# Patient Record
Sex: MALE | Race: WHITE | HISPANIC OR LATINO
[De-identification: names, ages, dates, MRNs, and addresses within clinical notes are randomized per-mention and may not be internally consistent; named-entity substitution may affect disease eponyms.]

---

## 2021-02-11 ENCOUNTER — NON-APPOINTMENT (OUTPATIENT)
Age: 71
End: 2021-02-11

## 2021-02-11 DIAGNOSIS — Z78.9 OTHER SPECIFIED HEALTH STATUS: ICD-10-CM

## 2021-02-11 DIAGNOSIS — E78.2 MIXED HYPERLIPIDEMIA: ICD-10-CM

## 2021-02-11 DIAGNOSIS — Z86.2 PERSONAL HISTORY OF DISEASES OF THE BLOOD AND BLOOD-FORMING ORGANS AND CERTAIN DISORDERS INVOLVING THE IMMUNE MECHANISM: ICD-10-CM

## 2021-02-11 DIAGNOSIS — R73.03 PREDIABETES.: ICD-10-CM

## 2021-02-11 DIAGNOSIS — N40.0 BENIGN PROSTATIC HYPERPLASIA WITHOUT LOWER URINARY TRACT SYMPMS: ICD-10-CM

## 2021-02-11 PROBLEM — Z00.00 ENCOUNTER FOR PREVENTIVE HEALTH EXAMINATION: Status: ACTIVE | Noted: 2021-02-11

## 2021-02-11 RX ORDER — ATORVASTATIN CALCIUM 80 MG/1
TABLET, FILM COATED ORAL
Refills: 0 | Status: ACTIVE | COMMUNITY

## 2021-02-11 RX ORDER — LOSARTAN POTASSIUM 100 MG/1
TABLET, FILM COATED ORAL
Refills: 0 | Status: ACTIVE | COMMUNITY

## 2021-02-11 RX ORDER — METOPROLOL SUCCINATE 200 MG/1
TABLET, EXTENDED RELEASE ORAL
Refills: 0 | Status: ACTIVE | COMMUNITY

## 2021-02-21 ENCOUNTER — LABORATORY RESULT (OUTPATIENT)
Age: 71
End: 2021-02-21

## 2021-02-22 ENCOUNTER — APPOINTMENT (OUTPATIENT)
Dept: GASTROENTEROLOGY | Facility: CLINIC | Age: 71
End: 2021-02-22
Payer: MEDICARE

## 2021-02-22 VITALS
SYSTOLIC BLOOD PRESSURE: 110 MMHG | BODY MASS INDEX: 26.83 KG/M2 | DIASTOLIC BLOOD PRESSURE: 68 MMHG | HEIGHT: 68 IN | WEIGHT: 177 LBS

## 2021-02-22 DIAGNOSIS — R10.13 EPIGASTRIC PAIN: ICD-10-CM

## 2021-02-22 DIAGNOSIS — B96.81 GASTRITIS, UNSPECIFIED, W/OUT BLEEDING: ICD-10-CM

## 2021-02-22 DIAGNOSIS — K29.70 GASTRITIS, UNSPECIFIED, W/OUT BLEEDING: ICD-10-CM

## 2021-02-22 PROCEDURE — 83014 H PYLORI DRUG ADMIN: CPT

## 2021-02-22 PROCEDURE — 99072 ADDL SUPL MATRL&STAF TM PHE: CPT

## 2021-02-22 PROCEDURE — 99214 OFFICE O/P EST MOD 30 MIN: CPT

## 2021-02-22 RX ORDER — SODIUM SULFATE, POTASSIUM SULFATE, MAGNESIUM SULFATE 17.5; 3.13; 1.6 G/ML; G/ML; G/ML
17.5-3.13-1.6 SOLUTION, CONCENTRATE ORAL
Refills: 0 | Status: DISCONTINUED | COMMUNITY
End: 2021-02-22

## 2021-02-22 RX ORDER — OMEPRAZOLE 40 MG/1
40 CAPSULE, DELAYED RELEASE ORAL
Refills: 0 | Status: DISCONTINUED | COMMUNITY
End: 2021-02-22

## 2021-02-22 RX ORDER — IBUPROFEN 600 MG/1
600 TABLET ORAL
Qty: 20 | Refills: 0 | Status: ACTIVE | COMMUNITY
Start: 2021-01-29

## 2021-02-22 RX ORDER — AMOXICILLIN 500 MG/1
500 CAPSULE ORAL
Refills: 0 | Status: DISCONTINUED | COMMUNITY
End: 2021-02-22

## 2021-02-22 RX ORDER — CLARITHROMYCIN 500 MG/1
500 TABLET, FILM COATED ORAL
Refills: 0 | Status: DISCONTINUED | COMMUNITY
End: 2021-02-22

## 2021-02-22 RX ORDER — PANTOPRAZOLE 40 MG/1
40 TABLET, DELAYED RELEASE ORAL
Qty: 30 | Refills: 1 | Status: ACTIVE | COMMUNITY
Start: 2021-02-22 | End: 1900-01-01

## 2021-02-22 RX ORDER — OMEPRAZOLE 20 MG/1
20 CAPSULE, DELAYED RELEASE ORAL
Qty: 28 | Refills: 0 | Status: DISCONTINUED | COMMUNITY
Start: 2021-01-18

## 2021-02-22 RX ORDER — FAMOTIDINE 40 MG/1
40 TABLET, FILM COATED ORAL
Refills: 0 | Status: DISCONTINUED | COMMUNITY
End: 2021-02-22

## 2021-02-22 NOTE — HISTORY OF PRESENT ILLNESS
[FreeTextEntry1] : Is a 7-year-old male returns for his breath test to document eradication of H. pylori.  He still complains of occasional postprandial discomfort in the epigastrium.  There is no nausea or vomiting.

## 2021-02-22 NOTE — ASSESSMENT
[FreeTextEntry1] : 77-year-old male history of H. pylori gastritis status post treatment for breath test.  Residual epigastric discomfort likely secondary to gastritis.  We will treat with proton pump inhibitor for an additional 4 weeks and see him in follow-up in 1 month.

## 2021-02-22 NOTE — PHYSICAL EXAM
[General Appearance - Alert] : alert [General Appearance - In No Acute Distress] : in no acute distress [Sclera] : the sclera and conjunctiva were normal [PERRL With Normal Accommodation] : pupils were equal in size, round, and reactive to light [Extraocular Movements] : extraocular movements were intact [Outer Ear] : the ears and nose were normal in appearance [Oropharynx] : the oropharynx was normal [Neck Appearance] : the appearance of the neck was normal [Neck Cervical Mass (___cm)] : no neck mass was observed [Jugular Venous Distention Increased] : there was no jugular-venous distention [Thyroid Diffuse Enlargement] : the thyroid was not enlarged [Thyroid Nodule] : there were no palpable thyroid nodules [] : no respiratory distress [Auscultation Breath Sounds / Voice Sounds] : lungs were clear to auscultation bilaterally [Heart Rate And Rhythm] : heart rate was normal and rhythm regular [Heart Sounds] : normal S1 and S2 [Heart Sounds Gallop] : no gallops [Murmurs] : no murmurs [Heart Sounds Pericardial Friction Rub] : no pericardial rub [Bowel Sounds] : normal bowel sounds [Abdomen Soft] : soft [Abdomen Tenderness] : non-tender [No CVA Tenderness] : no ~M costovertebral angle tenderness [Abnormal Walk] : normal gait

## 2021-02-24 RX ORDER — AMOXICILLIN 500 MG/1
500 CAPSULE ORAL
Qty: 56 | Refills: 0 | Status: ACTIVE | COMMUNITY
Start: 2021-02-24 | End: 1900-01-01

## 2021-02-24 RX ORDER — LEVOFLOXACIN 500 MG/1
500 TABLET, FILM COATED ORAL DAILY
Qty: 14 | Refills: 0 | Status: ACTIVE | COMMUNITY
Start: 2021-02-24 | End: 1900-01-01

## 2021-02-24 RX ORDER — OMEPRAZOLE 20 MG/1
20 CAPSULE, DELAYED RELEASE ORAL
Qty: 28 | Refills: 0 | Status: ACTIVE | COMMUNITY
Start: 2021-02-24 | End: 1900-01-01

## 2021-03-08 ENCOUNTER — APPOINTMENT (OUTPATIENT)
Dept: GASTROENTEROLOGY | Facility: CLINIC | Age: 71
End: 2021-03-08

## 2021-03-26 ENCOUNTER — APPOINTMENT (OUTPATIENT)
Dept: GASTROENTEROLOGY | Facility: CLINIC | Age: 71
End: 2021-03-26

## 2021-03-26 ENCOUNTER — APPOINTMENT (OUTPATIENT)
Dept: GASTROENTEROLOGY | Facility: CLINIC | Age: 71
End: 2021-03-26
Payer: MEDICARE

## 2021-03-26 PROCEDURE — 83014 H PYLORI DRUG ADMIN: CPT

## 2021-03-26 PROCEDURE — 99072 ADDL SUPL MATRL&STAF TM PHE: CPT

## 2021-03-29 ENCOUNTER — NON-APPOINTMENT (OUTPATIENT)
Age: 71
End: 2021-03-29

## 2021-03-29 LAB — UREA BREATH TEST QL: NEGATIVE

## 2024-03-06 ENCOUNTER — APPOINTMENT (OUTPATIENT)
Dept: GASTROENTEROLOGY | Facility: CLINIC | Age: 74
End: 2024-03-06
Payer: MEDICARE

## 2024-03-06 VITALS
RESPIRATION RATE: 12 BRPM | DIASTOLIC BLOOD PRESSURE: 76 MMHG | SYSTOLIC BLOOD PRESSURE: 130 MMHG | TEMPERATURE: 97.8 F | HEART RATE: 86 BPM | BODY MASS INDEX: 27.89 KG/M2 | HEIGHT: 68 IN | WEIGHT: 184 LBS | OXYGEN SATURATION: 97 %

## 2024-03-06 PROCEDURE — 99213 OFFICE O/P EST LOW 20 MIN: CPT

## 2024-03-06 NOTE — HISTORY OF PRESENT ILLNESS
[FreeTextEntry1] : 73year-old male previous hpylori, eradicated with 2months of epigastric pain, no nausea, no vomiting. Has good appetite.no weight loss.occ baby asa

## 2024-03-06 NOTE — PHYSICAL EXAM
[General Appearance - Alert] : alert [General Appearance - In No Acute Distress] : in no acute distress [Sclera] : the sclera and conjunctiva were normal [Extraocular Movements] : extraocular movements were intact [PERRL With Normal Accommodation] : pupils were equal in size, round, and reactive to light [Outer Ear] : the ears and nose were normal in appearance [Oropharynx] : the oropharynx was normal [Neck Appearance] : the appearance of the neck was normal [Thyroid Diffuse Enlargement] : the thyroid was not enlarged [Jugular Venous Distention Increased] : there was no jugular-venous distention [Neck Cervical Mass (___cm)] : no neck mass was observed [] : no respiratory distress [Thyroid Nodule] : there were no palpable thyroid nodules [Auscultation Breath Sounds / Voice Sounds] : lungs were clear to auscultation bilaterally [Heart Rate And Rhythm] : heart rate was normal and rhythm regular [Heart Sounds] : normal S1 and S2 [Heart Sounds Gallop] : no gallops [Murmurs] : no murmurs [Heart Sounds Pericardial Friction Rub] : no pericardial rub [Bowel Sounds] : normal bowel sounds [Abdomen Soft] : soft [Abdomen Tenderness] : non-tender [No CVA Tenderness] : no ~M costovertebral angle tenderness [Abnormal Walk] : normal gait

## 2024-03-06 NOTE — ASSESSMENT
[FreeTextEntry1] : 73year-old male previous hpylori, eradicated with 2months of epigastric pain, no nausea, no vomiting. Has good appetite.no weight loss.  1. epigastric pain 2. r/o ulcer 3. Hx cad  PLAN: esomeprazole 40 mg po qd x 2 months 2 months rpa if no improvement then egd.

## 2024-10-06 ENCOUNTER — INPATIENT (INPATIENT)
Facility: HOSPITAL | Age: 74
LOS: 1 days | Discharge: HOME | DRG: 661 | End: 2024-10-08
Attending: UROLOGY | Admitting: UROLOGY
Payer: COMMERCIAL

## 2024-10-06 VITALS
SYSTOLIC BLOOD PRESSURE: 189 MMHG | OXYGEN SATURATION: 97 % | HEART RATE: 90 BPM | TEMPERATURE: 98 F | HEIGHT: 68 IN | WEIGHT: 175.05 LBS | RESPIRATION RATE: 19 BRPM | DIASTOLIC BLOOD PRESSURE: 88 MMHG

## 2024-10-06 LAB
ALBUMIN SERPL ELPH-MCNC: 4 G/DL — SIGNIFICANT CHANGE UP (ref 3.3–5)
ALP SERPL-CCNC: 88 U/L — SIGNIFICANT CHANGE UP (ref 40–120)
ALT FLD-CCNC: 15 U/L — SIGNIFICANT CHANGE UP (ref 10–45)
ANION GAP SERPL CALC-SCNC: 11 MMOL/L — SIGNIFICANT CHANGE UP (ref 5–17)
APPEARANCE UR: CLEAR — SIGNIFICANT CHANGE UP
AST SERPL-CCNC: 18 U/L — SIGNIFICANT CHANGE UP (ref 10–40)
BACTERIA # UR AUTO: NEGATIVE /HPF — SIGNIFICANT CHANGE UP
BASOPHILS # BLD AUTO: 0.04 K/UL — SIGNIFICANT CHANGE UP (ref 0–0.2)
BASOPHILS NFR BLD AUTO: 0.3 % — SIGNIFICANT CHANGE UP (ref 0–2)
BILIRUB SERPL-MCNC: 0.8 MG/DL — SIGNIFICANT CHANGE UP (ref 0.2–1.2)
BILIRUB UR-MCNC: NEGATIVE — SIGNIFICANT CHANGE UP
BUN SERPL-MCNC: 23 MG/DL — SIGNIFICANT CHANGE UP (ref 7–23)
CALCIUM SERPL-MCNC: 9.2 MG/DL — SIGNIFICANT CHANGE UP (ref 8.4–10.5)
CAST: 0 /LPF — SIGNIFICANT CHANGE UP (ref 0–4)
CHLORIDE SERPL-SCNC: 100 MMOL/L — SIGNIFICANT CHANGE UP (ref 96–108)
CO2 SERPL-SCNC: 24 MMOL/L — SIGNIFICANT CHANGE UP (ref 22–31)
COLOR SPEC: YELLOW — SIGNIFICANT CHANGE UP
CREAT SERPL-MCNC: 2.4 MG/DL — HIGH (ref 0.5–1.3)
DIFF PNL FLD: NEGATIVE — SIGNIFICANT CHANGE UP
EGFR: 28 ML/MIN/1.73M2 — LOW
EOSINOPHIL # BLD AUTO: 0.01 K/UL — SIGNIFICANT CHANGE UP (ref 0–0.5)
EOSINOPHIL NFR BLD AUTO: 0.1 % — SIGNIFICANT CHANGE UP (ref 0–6)
GAS PNL BLDV: SIGNIFICANT CHANGE UP
GLUCOSE SERPL-MCNC: 136 MG/DL — HIGH (ref 70–99)
GLUCOSE UR QL: NEGATIVE MG/DL — SIGNIFICANT CHANGE UP
HCT VFR BLD CALC: 43.8 % — SIGNIFICANT CHANGE UP (ref 39–50)
HGB BLD-MCNC: 14.6 G/DL — SIGNIFICANT CHANGE UP (ref 13–17)
IMM GRANULOCYTES NFR BLD AUTO: 0.5 % — SIGNIFICANT CHANGE UP (ref 0–0.9)
KETONES UR-MCNC: NEGATIVE MG/DL — SIGNIFICANT CHANGE UP
LEUKOCYTE ESTERASE UR-ACNC: NEGATIVE — SIGNIFICANT CHANGE UP
LIDOCAIN IGE QN: 19 U/L — SIGNIFICANT CHANGE UP (ref 7–60)
LYMPHOCYTES # BLD AUTO: 0.53 K/UL — LOW (ref 1–3.3)
LYMPHOCYTES # BLD AUTO: 4 % — LOW (ref 13–44)
MCHC RBC-ENTMCNC: 29.3 PG — SIGNIFICANT CHANGE UP (ref 27–34)
MCHC RBC-ENTMCNC: 33.3 GM/DL — SIGNIFICANT CHANGE UP (ref 32–36)
MCV RBC AUTO: 88 FL — SIGNIFICANT CHANGE UP (ref 80–100)
MONOCYTES # BLD AUTO: 1.24 K/UL — HIGH (ref 0–0.9)
MONOCYTES NFR BLD AUTO: 9.4 % — SIGNIFICANT CHANGE UP (ref 2–14)
NEUTROPHILS # BLD AUTO: 11.26 K/UL — HIGH (ref 1.8–7.4)
NEUTROPHILS NFR BLD AUTO: 85.7 % — HIGH (ref 43–77)
NITRITE UR-MCNC: NEGATIVE — SIGNIFICANT CHANGE UP
NRBC # BLD: 0 /100 WBCS — SIGNIFICANT CHANGE UP (ref 0–0)
PH UR: 6 — SIGNIFICANT CHANGE UP (ref 5–8)
PLATELET # BLD AUTO: 183 K/UL — SIGNIFICANT CHANGE UP (ref 150–400)
POTASSIUM SERPL-MCNC: 4.4 MMOL/L — SIGNIFICANT CHANGE UP (ref 3.5–5.3)
POTASSIUM SERPL-SCNC: 4.4 MMOL/L — SIGNIFICANT CHANGE UP (ref 3.5–5.3)
PROT SERPL-MCNC: 7.1 G/DL — SIGNIFICANT CHANGE UP (ref 6–8.3)
PROT UR-MCNC: NEGATIVE MG/DL — SIGNIFICANT CHANGE UP
RBC # BLD: 4.98 M/UL — SIGNIFICANT CHANGE UP (ref 4.2–5.8)
RBC # FLD: 12.9 % — SIGNIFICANT CHANGE UP (ref 10.3–14.5)
RBC CASTS # UR COMP ASSIST: 0 /HPF — SIGNIFICANT CHANGE UP (ref 0–4)
SODIUM SERPL-SCNC: 135 MMOL/L — SIGNIFICANT CHANGE UP (ref 135–145)
SP GR SPEC: 1.01 — SIGNIFICANT CHANGE UP (ref 1–1.03)
SQUAMOUS # UR AUTO: 0 /HPF — SIGNIFICANT CHANGE UP (ref 0–5)
UROBILINOGEN FLD QL: 0.2 MG/DL — SIGNIFICANT CHANGE UP (ref 0.2–1)
WBC # BLD: 13.14 K/UL — HIGH (ref 3.8–10.5)
WBC # FLD AUTO: 13.14 K/UL — HIGH (ref 3.8–10.5)
WBC UR QL: 0 /HPF — SIGNIFICANT CHANGE UP (ref 0–5)

## 2024-10-06 PROCEDURE — 99285 EMERGENCY DEPT VISIT HI MDM: CPT

## 2024-10-06 PROCEDURE — 74177 CT ABD & PELVIS W/CONTRAST: CPT | Mod: 26,MC

## 2024-10-06 RX ORDER — KETOROLAC TROMETHAMINE 10 MG/1
15 TABLET, FILM COATED ORAL ONCE
Refills: 0 | Status: DISCONTINUED | OUTPATIENT
Start: 2024-10-06 | End: 2024-10-06

## 2024-10-06 RX ORDER — ONDANSETRON HCL/PF 4 MG/2 ML
4 VIAL (ML) INJECTION ONCE
Refills: 0 | Status: COMPLETED | OUTPATIENT
Start: 2024-10-06 | End: 2024-10-06

## 2024-10-06 RX ORDER — SODIUM CHLORIDE 0.9 % (FLUSH) 0.9 %
1000 SYRINGE (ML) INJECTION ONCE
Refills: 0 | Status: COMPLETED | OUTPATIENT
Start: 2024-10-06 | End: 2024-10-06

## 2024-10-06 RX ORDER — MORPHINE SULFATE 30 MG/1
4 TABLET, FILM COATED, EXTENDED RELEASE ORAL ONCE
Refills: 0 | Status: DISCONTINUED | OUTPATIENT
Start: 2024-10-06 | End: 2024-10-06

## 2024-10-06 RX ADMIN — Medication 1000 MILLILITER(S): at 21:07

## 2024-10-06 RX ADMIN — Medication 4 MILLIGRAM(S): at 21:08

## 2024-10-06 RX ADMIN — MORPHINE SULFATE 4 MILLIGRAM(S): 30 TABLET, FILM COATED, EXTENDED RELEASE ORAL at 21:10

## 2024-10-06 RX ADMIN — KETOROLAC TROMETHAMINE 15 MILLIGRAM(S): 10 TABLET, FILM COATED ORAL at 21:11

## 2024-10-06 NOTE — ED ADULT NURSE NOTE - OBJECTIVE STATEMENT
74y M Denise x4 came in for right sided flank pain. Patient reports he was seen at [facility] two days ago and was told her has a kidney stone. Patient was sent home with d/c instructions to monitor for the stone to pass but states pain has been continuously worse. Patient is reporting that the pain is now radiating into his abdomen. Denies hematuria, dysuria, and pyuria. Denies fever, chills, headache, blurry vision, v/d, weakness, numbness, tingling, SOB, and chest pain. Patient reports he is nauseous from the pain. Bed is in lowest position.

## 2024-10-06 NOTE — ED ADULT NURSE NOTE - NSFALLUNIVINTERV_ED_ALL_ED
Bed/Stretcher in lowest position, wheels locked, appropriate side rails in place/Call bell, personal items and telephone in reach/Instruct patient to call for assistance before getting out of bed/chair/stretcher/Non-slip footwear applied when patient is off stretcher/[place] to call system/Physically safe environment - no spills, clutter or unnecessary equipment/Purposeful proactive rounding/Room/bathroom lighting operational, light cord in reach

## 2024-10-07 DIAGNOSIS — N20.0 CALCULUS OF KIDNEY: ICD-10-CM

## 2024-10-07 PROCEDURE — 99222 1ST HOSP IP/OBS MODERATE 55: CPT

## 2024-10-07 PROCEDURE — 71045 X-RAY EXAM CHEST 1 VIEW: CPT | Mod: 26

## 2024-10-07 RX ORDER — LOSARTAN POTASSIUM 100 MG/1
25 TABLET, FILM COATED ORAL DAILY
Refills: 0 | Status: DISCONTINUED | OUTPATIENT
Start: 2024-10-07 | End: 2024-10-08

## 2024-10-07 RX ORDER — OXYCODONE HYDROCHLORIDE 30 MG/1
10 TABLET, FILM COATED, EXTENDED RELEASE ORAL EVERY 4 HOURS
Refills: 0 | Status: DISCONTINUED | OUTPATIENT
Start: 2024-10-07 | End: 2024-10-07

## 2024-10-07 RX ORDER — SENNOSIDES 8.6 MG
2 TABLET ORAL AT BEDTIME
Refills: 0 | Status: DISCONTINUED | OUTPATIENT
Start: 2024-10-07 | End: 2024-10-08

## 2024-10-07 RX ORDER — TAMSULOSIN HCL 0.4 MG
0.4 CAPSULE ORAL AT BEDTIME
Refills: 0 | Status: DISCONTINUED | OUTPATIENT
Start: 2024-10-07 | End: 2024-10-08

## 2024-10-07 RX ORDER — SODIUM CHLORIDE IRRIG SOLUTION 0.9 %
1000 SOLUTION, IRRIGATION IRRIGATION
Refills: 0 | Status: DISCONTINUED | OUTPATIENT
Start: 2024-10-07 | End: 2024-10-08

## 2024-10-07 RX ORDER — LOSARTAN POTASSIUM 100 MG/1
25 TABLET, FILM COATED ORAL DAILY
Refills: 0 | Status: DISCONTINUED | OUTPATIENT
Start: 2024-10-07 | End: 2024-10-07

## 2024-10-07 RX ORDER — ATORVASTATIN CALCIUM 10 MG/1
40 TABLET, FILM COATED ORAL AT BEDTIME
Refills: 0 | Status: DISCONTINUED | OUTPATIENT
Start: 2024-10-07 | End: 2024-10-07

## 2024-10-07 RX ORDER — HYDROMORPHONE HYDROCHLORIDE 1 MG/ML
0.25 INJECTION, SOLUTION INTRAMUSCULAR; INTRAVENOUS; SUBCUTANEOUS EVERY 4 HOURS
Refills: 0 | Status: DISCONTINUED | OUTPATIENT
Start: 2024-10-07 | End: 2024-10-08

## 2024-10-07 RX ORDER — MORPHINE SULFATE 30 MG/1
2 TABLET, FILM COATED, EXTENDED RELEASE ORAL EVERY 6 HOURS
Refills: 0 | Status: DISCONTINUED | OUTPATIENT
Start: 2024-10-07 | End: 2024-10-07

## 2024-10-07 RX ORDER — OXYCODONE HYDROCHLORIDE 30 MG/1
5 TABLET, FILM COATED, EXTENDED RELEASE ORAL EVERY 4 HOURS
Refills: 0 | Status: DISCONTINUED | OUTPATIENT
Start: 2024-10-07 | End: 2024-10-07

## 2024-10-07 RX ORDER — INFLUENZA VIRUS VACCINE 15; 15; 15; 15 UG/.5ML; UG/.5ML; UG/.5ML; UG/.5ML
0.5 SUSPENSION INTRAMUSCULAR ONCE
Refills: 0 | Status: DISCONTINUED | OUTPATIENT
Start: 2024-10-07 | End: 2024-10-08

## 2024-10-07 RX ORDER — ONDANSETRON HCL/PF 4 MG/2 ML
4 VIAL (ML) INJECTION EVERY 6 HOURS
Refills: 0 | Status: DISCONTINUED | OUTPATIENT
Start: 2024-10-07 | End: 2024-10-08

## 2024-10-07 RX ORDER — ATORVASTATIN CALCIUM 10 MG/1
40 TABLET, FILM COATED ORAL AT BEDTIME
Refills: 0 | Status: DISCONTINUED | OUTPATIENT
Start: 2024-10-07 | End: 2024-10-08

## 2024-10-07 RX ORDER — ACETAMINOPHEN 325 MG
975 TABLET ORAL EVERY 6 HOURS
Refills: 0 | Status: DISCONTINUED | OUTPATIENT
Start: 2024-10-07 | End: 2024-10-08

## 2024-10-07 RX ORDER — OXYCODONE HYDROCHLORIDE 30 MG/1
5 TABLET, FILM COATED, EXTENDED RELEASE ORAL EVERY 8 HOURS
Refills: 0 | Status: DISCONTINUED | OUTPATIENT
Start: 2024-10-07 | End: 2024-10-08

## 2024-10-07 RX ORDER — OXYCODONE HYDROCHLORIDE 30 MG/1
10 TABLET, FILM COATED, EXTENDED RELEASE ORAL EVERY 8 HOURS
Refills: 0 | Status: DISCONTINUED | OUTPATIENT
Start: 2024-10-07 | End: 2024-10-08

## 2024-10-07 RX ADMIN — ATORVASTATIN CALCIUM 40 MILLIGRAM(S): 10 TABLET, FILM COATED ORAL at 03:55

## 2024-10-07 RX ADMIN — LOSARTAN POTASSIUM 25 MILLIGRAM(S): 100 TABLET, FILM COATED ORAL at 03:56

## 2024-10-07 RX ADMIN — OXYCODONE HYDROCHLORIDE 10 MILLIGRAM(S): 30 TABLET, FILM COATED, EXTENDED RELEASE ORAL at 09:27

## 2024-10-07 RX ADMIN — ATORVASTATIN CALCIUM 40 MILLIGRAM(S): 10 TABLET, FILM COATED ORAL at 21:38

## 2024-10-07 RX ADMIN — LOSARTAN POTASSIUM 25 MILLIGRAM(S): 100 TABLET, FILM COATED ORAL at 11:03

## 2024-10-07 RX ADMIN — Medication 5000 UNIT(S): at 14:25

## 2024-10-07 RX ADMIN — Medication 5000 UNIT(S): at 21:37

## 2024-10-07 RX ADMIN — Medication 125 MILLILITER(S): at 14:31

## 2024-10-07 RX ADMIN — OXYCODONE HYDROCHLORIDE 10 MILLIGRAM(S): 30 TABLET, FILM COATED, EXTENDED RELEASE ORAL at 17:41

## 2024-10-07 RX ADMIN — OXYCODONE HYDROCHLORIDE 10 MILLIGRAM(S): 30 TABLET, FILM COATED, EXTENDED RELEASE ORAL at 10:20

## 2024-10-07 RX ADMIN — Medication 0.4 MILLIGRAM(S): at 21:38

## 2024-10-07 RX ADMIN — OXYCODONE HYDROCHLORIDE 10 MILLIGRAM(S): 30 TABLET, FILM COATED, EXTENDED RELEASE ORAL at 18:40

## 2024-10-07 RX ADMIN — Medication 125 MILLILITER(S): at 05:47

## 2024-10-07 NOTE — CONSULT NOTE ADULT - SUBJECTIVE AND OBJECTIVE BOX
HPI:  74yoM PMHx of HTN (non-compliant with medications), HLD, ?TURP for BPH x2/ presents with R flank pain x3d. Patient states the pain began 10/4/24, increased in severity, prompting pt to go to [facility] ED. Pt states he was told he had an obstructing stone and that he needed to drink lots of water to pass it. Was pain controlled there and discharged with flomax and tylenol. Pt states the tylenol aided in pain relief x1d however, the next 2d his pain increasingly got worse, prompting ED visit today. Pt endorsing pain on R flank side and nausea.   States he believes he has passed 2 stones in the past (x10yrs ago), denies ever seeing a HCP for the stones.   Denies fever but states he feels hot, did not take a temperature, was afebrile here in [facility] ED and in [facility] ED.  Denies dysuria, urinary urgency, urinary retention, gross hematuria, h/o seeing a urologist, urologist hx.   Last ate ~6hrs ago.   Saw a urologist at [facility] in  or  for prostate surgery, went into urinary retention because of BPH. Has not seen a urologist since then.  (07 Oct 2024 03:27)      PAST MEDICAL & SURGICAL HISTORY:      Review of Systems:   CONSTITUTIONAL: No fever, weight loss, or fatigue  EYES: No eye pain, visual disturbances, or discharge  ENMT:  No difficulty hearing, tinnitus, vertigo; No sinus or throat pain  NECK: No pain or stiffness  BREASTS: No pain, masses, or nipple discharge  RESPIRATORY: No cough, wheezing, chills or hemoptysis; No shortness of breath  CARDIOVASCULAR: No chest pain, palpitations, dizziness, or leg swelling  GASTROINTESTINAL: No abdominal or epigastric pain. No nausea, vomiting, or hematemesis; No diarrhea or constipation. No melena or hematochezia.  GENITOURINARY: No dysuria, frequency, hematuria, or incontinence  NEUROLOGICAL: No headaches, memory loss, loss of strength, numbness, or tremors  SKIN: No itching, burning, rashes, or lesions   LYMPH NODES: No enlarged glands  ENDOCRINE: No heat or cold intolerance; No hair loss  MUSCULOSKELETAL: No joint pain or swelling; No muscle, back, or extremity pain  PSYCHIATRIC: No depression, anxiety, mood swings, or difficulty sleeping  HEME/LYMPH: No easy bruising, or bleeding gums  ALLERY AND IMMUNOLOGIC: No hives or eczema    Allergies    No Known Allergies    Intolerances        Social History:     FAMILY HISTORY:      MEDICATIONS  (STANDING):  atorvastatin 40 milliGRAM(s) Oral at bedtime  heparin   Injectable 5000 Unit(s) SubCutaneous every 8 hours  influenza  Vaccine (HIGH DOSE) 0.5 milliLiter(s) IntraMuscular once  lactated ringers. 1000 milliLiter(s) (125 mL/Hr) IV Continuous <Continuous>  losartan 25 milliGRAM(s) Oral daily  tamsulosin 0.4 milliGRAM(s) Oral at bedtime    MEDICATIONS  (PRN):  acetaminophen     Tablet .. 975 milliGRAM(s) Oral every 6 hours PRN Temp greater or equal to 38C (100.4F), Mild Pain (1 - 3)  HYDROmorphone  Injectable 0.25 milliGRAM(s) IV Push every 4 hours PRN breakthrough pain  ondansetron Injectable 4 milliGRAM(s) IV Push every 6 hours PRN Nausea and/or Vomiting  oxyCODONE    IR 5 milliGRAM(s) Oral every 8 hours PRN Moderate Pain (4 - 6)  oxyCODONE    IR 10 milliGRAM(s) Oral every 8 hours PRN Severe Pain (7 - 10)  senna 2 Tablet(s) Oral at bedtime PRN Constipation        CAPILLARY BLOOD GLUCOSE        I&O's Summary    06 Oct 2024 07:01  -  07 Oct 2024 07:00  --------------------------------------------------------  IN: 375 mL / OUT: 0 mL / NET: 375 mL    07 Oct 2024 07:01  -  07 Oct 2024 19:55  --------------------------------------------------------  IN: 1500 mL / OUT: 1050 mL / NET: 450 mL        PHYSICAL EXAM:  GENERAL: NAD, well-developed  HEAD:  Atraumatic, Normocephalic  EYES: EOMI, PERRLA, conjunctiva and sclera clear  NECK: Supple, No JVD  CHEST/LUNG: Clear to auscultation bilaterally; No wheeze  HEART: Regular rate and rhythm; No murmurs, rubs, or gallops  ABDOMEN: Soft, Nontender, Nondistended; Bowel sounds present  EXTREMITIES:  2+ Peripheral Pulses, No clubbing, cyanosis, or edema  PSYCH: AAOx3  NEUROLOGY: non-focal  SKIN: No rashes or lesions    LABS:                        14.6   13.14 )-----------( 183      ( 06 Oct 2024 21:21 )             43.8     10    135  |  100  |  23  ----------------------------<  136[H]  4.4   |  24  |  2.40[H]    Ca    9.2      06 Oct 2024 21:21    TPro  7.1  /  Alb  4.0  /  TBili  0.8  /  DBili  x   /  AST  18  /  ALT  15  /  AlkPhos  88  10-06          Urinalysis Basic - ( 06 Oct 2024 21:21 )    Color: Yellow / Appearance: Clear / S.012 / pH: x  Gluc: 136 mg/dL / Ketone: Negative mg/dL  / Bili: Negative / Urobili: 0.2 mg/dL   Blood: x / Protein: Negative mg/dL / Nitrite: Negative   Leuk Esterase: Negative / RBC: 0 /HPF / WBC 0 /HPF   Sq Epi: x / Non Sq Epi: 0 /HPF / Bacteria: Negative /HPF        RADIOLOGY & ADDITIONAL TESTS:    Imaging Personally Reviewed:    Consultant(s) Notes Reviewed:      Care Discussed with Consultants/Other Providers:

## 2024-10-07 NOTE — PATIENT PROFILE ADULT - FALL HARM RISK - HARM RISK INTERVENTIONS

## 2024-10-07 NOTE — H&P ADULT - NSHPPHYSICALEXAM_GEN_ALL_CORE
Physical Exam  Gen: NAD  HEENT: normocephalic, atraumatic, no scleral icterus  Pulm: No respiratory distress, no subcostal retractions, no accessory muscle use   CV: no JVD  Abd: Soft, NT, ND, no rebound tenderness or guarding  : Voiding freely.  MSK:  + R CVAT, Moving all extremities, full ROM in all extremities,   NEURO: A&Ox3, no focal neurological deficits, CN 2-12 grossly intact  SKIN: warm, dry

## 2024-10-07 NOTE — ED PROVIDER NOTE - OBJECTIVE STATEMENT
74-year-old male with no reported PMH presenting with flank pain. Patient seen at [facility] 2 days ago and diagnosed with 0.3 cm obstructing stone. 74-year-old male with no reported PMH presenting with flank pain. Patient seen at [facility] 2 days ago and diagnosed with 0.3 cm obstructing stone. Was dc'd with prescription for Flomax, which he has been taking along with tylenol/motrin without sufficient pain relief. 74-year-old male with no reported PMH presenting with flank pain. Patient seen at [facility] 2 days ago and diagnosed with 0.3 cm obstructing R kidney stone. Was dc'd with prescription for Flomax, which he has been taking along with tylenol/motrin without sufficient pain relief. Denies fevers, vomiting, hematuria, dysuria, retention.  No history of kidney stone.

## 2024-10-07 NOTE — H&P ADULT - ASSESSMENT
74yoM PMHx of HTN (non-compliant with medications), HLD, ?TURP for BPH x2022/2023 presents with R flank pain x3d.   Pt is a bounceback for pain from 3mm R distal ureteral stone, went to [facility] 10/4/24 for pain, discharged with pain control, back with pain.   Denies fever at home, afebrile in [facility], afebrile here in ED  Labs WBC 13.14 / Hct 43.8 / SCr 2.40 (SCr 1.3 at [facility] on 10/4)   CT: obstructing 3mm stone in distal R ureter with mild R hydroureteronephrosis     - Admit to urology under Dr. Perez  - Add on non-emergently for cystoscopy, R ureteral stent placement for pain control, TA  - NPO   - Analgesics and antiemetics PRN   - Trend SCr   - Follow up UCx   - Consent to be obtained     Discussed with Dr. Perez    [facility]  [address] [place] [address], [address]  [place] [address] [phone]  74yoM PMHx of HTN (non-compliant with medications), HLD, ?TURP for BPH x2022/2023 presents with R flank pain x3d.   Pt is a bounceback for pain from 3mm R distal ureteral stone, went to [facility] 10/4/24 for pain, discharged with pain control, back with pain. Last ate 6hrs ago.   Denies fever at home, afebrile in [facility], afebrile here in ED  Labs WBC 13.14 / Hct 43.8 / SCr 2.40 (SCr 1.3 at [facility] on 10/4)   CT: obstructing 3mm stone in distal R ureter with mild R hydroureteronephrosis     - Admit to urology under Dr. Perez  - Add on non-emergently for cystoscopy, R ureteral stent placement for pain control, TA  - NPO   - Analgesics and antiemetics PRN   - Trend SCr   - Follow up UCx   - Consent to be obtained     Discussed with Dr. Perez    [facility]  [address] [place] [address], [address]  [place] [address] [phone]

## 2024-10-07 NOTE — ED PROVIDER NOTE - PROGRESS NOTE DETAILS
Ellen Luna MD PGY-3: urology consulted for obstructing stone with new TA Ellen Luna MD PGY-3: urology TBA under Dr Perez for intervention Ellen Luna MD PGY-3: Creatinine 2.4 today, from 1.34 at [facility] on 10/4. urology consulted for obstructing stone with new TA. no infection on UA

## 2024-10-07 NOTE — H&P ADULT - HISTORY OF PRESENT ILLNESS
74yoM PMHx of HTN (non-compliant with medications), HLD, ?TURP for BPH x2022/2023 presents with R flank pain x3d. Patient states the pain began 10/4/24, increased in severity, prompting pt to go to [facility] ED. Pt states he was told he had an obstructing stone and that he needed to drink lots of water to pass it. Was pain controlled there and discharged with flomax and tylenol. Pt states the tylenol aided in pain relief x1d however, the next 2d his pain increasingly got worse, prompting ED visit today. Pt endorsing pain on R flank side and nausea.   States he believes he has passed 2 stones in the past (x10yrs ago), denies ever seeing a HCP for the stones.   Denies fever but states he feels hot, did not take a temperature, was afebrile here in [facility] ED and in [facility] ED.  Denies dysuria, urinary urgency, urinary retention, gross hematuria, h/o seeing a urologist, urologist hx.   Saw a urologist at [facility] in 2022 or 2023 for prostate surgery, went into urinary retention because of BPH. Has not seen a urologist since then.  74yoM PMHx of HTN (non-compliant with medications), HLD, ?TURP for BPH x2022/2023 presents with R flank pain x3d. Patient states the pain began 10/4/24, increased in severity, prompting pt to go to [facility] ED. Pt states he was told he had an obstructing stone and that he needed to drink lots of water to pass it. Was pain controlled there and discharged with flomax and tylenol. Pt states the tylenol aided in pain relief x1d however, the next 2d his pain increasingly got worse, prompting ED visit today. Pt endorsing pain on R flank side and nausea.   States he believes he has passed 2 stones in the past (x10yrs ago), denies ever seeing a HCP for the stones.   Denies fever but states he feels hot, did not take a temperature, was afebrile here in [facility] ED and in [facility] ED.  Denies dysuria, urinary urgency, urinary retention, gross hematuria, h/o seeing a urologist, urologist hx.   Last ate ~6hrs ago.   Saw a urologist at [facility] in 2022 or 2023 for prostate surgery, went into urinary retention because of BPH. Has not seen a urologist since then.

## 2024-10-07 NOTE — ED PROVIDER NOTE - ATTENDING CONTRIBUTION TO CARE
73 yo M with PMHx of hypertension and hyperlipidemia presents with flank pain. Reports prior ER visit with diagnosis of r urolithiasis of 3 mm. He states he has been taking tylenol and flomax with no alleviation in his symptoms     PE: well appearing, mild distress, no respiratory distress.  Neuro nonfocal.  Skin intact. Psych normal mood.    MDM: Differential diagnosis includes but is not limited to urolithiasis, UTI, septic stone  Will reassess with labs and imaging and provide additional pain medication     Refer to progress notes for continued care

## 2024-10-07 NOTE — PRE PROCEDURE NOTE - PRE PROCEDURE EVALUATION
Urology Preop Note    Diagnosis: kidney stone  Procedure: cystoscopy, R ureteral stent placement  Surgeon: Dr. Perez                          14.6   13.14 )-----------( 183      ( 06 Oct 2024 21:21 )             43.8       10    135  |  100  |  23  ----------------------------<  136[H]  4.4   |  24  |  2.40[H]    Ca    9.2      06 Oct 2024 21:21    TPro  7.1  /  Alb  4.0  /  TBili  0.8  /  DBili  x   /  AST  18  /  ALT  15  /  AlkPhos  88  10-06          Urinalysis Basic - ( 06 Oct 2024 21:21 )    Color: Yellow / Appearance: Clear / S.012 / pH: x  Gluc: 136 mg/dL / Ketone: Negative mg/dL  / Bili: Negative / Urobili: 0.2 mg/dL   Blood: x / Protein: Negative mg/dL / Nitrite: Negative   Leuk Esterase: Negative / RBC: 0 /HPF / WBC 0 /HPF   Sq Epi: x / Non Sq Epi: 0 /HPF / Bacteria: Negative /HPF      UCx:    EKG: performed  CXR: ordered    Orders:  NPO after midnight  IVF after midnight   Consent   Clearance       Urology Preop Note    Diagnosis: kidney stone  Procedure: cystoscopy, R URS, possible R stent  Surgeon: Dr. Ivey                          14.6   13.14 )-----------( 183      ( 06 Oct 2024 21:21 )             43.8       10    135  |  100  |  23  ----------------------------<  136[H]  4.4   |  24  |  2.40[H]    Ca    9.2      06 Oct 2024 21:21    TPro  7.1  /  Alb  4.0  /  TBili  0.8  /  DBili  x   /  AST  18  /  ALT  15  /  AlkPhos  88  10-06          Urinalysis Basic - ( 06 Oct 2024 21:21 )    Color: Yellow / Appearance: Clear / S.012 / pH: x  Gluc: 136 mg/dL / Ketone: Negative mg/dL  / Bili: Negative / Urobili: 0.2 mg/dL   Blood: x / Protein: Negative mg/dL / Nitrite: Negative   Leuk Esterase: Negative / RBC: 0 /HPF / WBC 0 /HPF   Sq Epi: x / Non Sq Epi: 0 /HPF / Bacteria: Negative /HPF      UCx:    EKG: performed  CXR: ordered    Orders:  NPO after midnight  IVF after midnight   Consent   Medical clearance to be obtained

## 2024-10-07 NOTE — H&P ADULT - NSHPLABSRESULTS_GEN_ALL_CORE
CBC                        14.6   13.14 )-----------( 183      ( 06 Oct 2024 21:21 )             43.8     CHEM  10-06    135  |  100  |  23  ----------------------------<  136[H]  4.4   |  24  |  2.40[H]    Ca    9.2      06 Oct 2024 21:21    TPro  7.1  /  Alb  4.0  /  TBili  0.8  /  DBili  x   /  AST  18  /  ALT  15  /  AlkPhos  88  10-06    UA  Urinalysis Basic - ( 06 Oct 2024 21:21 )    Color: Yellow / Appearance: Clear / S.012 / pH: x  Gluc: 136 mg/dL / Ketone: Negative mg/dL  / Bili: Negative / Urobili: 0.2 mg/dL   Blood: x / Protein: Negative mg/dL / Nitrite: Negative   Leuk Esterase: Negative / RBC: 0 /HPF / WBC 0 /HPF   Sq Epi: x / Non Sq Epi: 0 /HPF / Bacteria: Negative /HPF    CT A/P  < from: CT Abdomen and Pelvis w/ IV Cont (10.06.24 @ 22:03) >    *  Obstructing 3 mm calculus in the distal right ureter with upstream   mild right hydroureteronephrosis.    < end of copied text >

## 2024-10-07 NOTE — ED PROVIDER NOTE - PHYSICAL EXAMINATION
GENERAL: Awake, alert, appears uncomfortable, pacing in hallway  HEAD: NC/AT, neck supple, moist mucous membranes  EYES: PERRL, EOM grossly intact, sclera anicteric  LUNGS: normal WOB on RA, CTAB, no wheezes or crackles   CARDIAC: RRR, no m/r/g  ABDOMEN: Soft, TTP over RLQ and R flank, non distended, no rebound  BACK: No midline spinal tenderness, no CVA tenderness  EXT: No edema, no calf tenderness, no deformities.  NEURO: A&Ox3. Moving all extremities.  SKIN: Warm and dry. No rash.  PSYCH: Normal affect.

## 2024-10-07 NOTE — CONSULT NOTE ADULT - ASSESSMENT
74yoM PMHx of CAD s/p stent, HTN (non-compliant with medications), HLD, ?TURP for BPH x2022/2023 presents with R flank pain x3d.   Pt is a bounceback for pain from 3mm R distal ureteral stone, went to [facility] 10/4/24 for pain, discharged with pain control, back with pain. Last ate 6hrs ago.   Denies fever at home, afebrile in [facility], afebrile here in ED  Labs WBC 13.14 / Hct 43.8 / SCr 2.40 (SCr 1.3 at [facility] on 10/4)   CT: obstructing 3mm stone in distal R ureter with mild R hydroureteronephrosis     Rt ureteral stone with Rt Hydroureteronephrosis   Urology consulted  Plan for ureteroscopy       CAD s/p stent Patient off Aspirin   Resume Aspirin if no contraindication   HTN Patient non compliant with medications   Continue with Losartan   HLD statins

## 2024-10-07 NOTE — PATIENT PROFILE ADULT - HAS THE PATIENT RECEIVED THE INFLUENZA VACCINE THIS SEASON?
Case Management Assessment           Initial Evaluation                Date / Time of Evaluation: 12/13/2021 12:01 PM                 Assessment Completed by: Sherrill Walter RN    Patient Name: Blanca Crisostomo     YOB: 1972  Diagnosis: Lymphocytosis [O52.866]  Metabolic acidosis [M43.3]  Hyperglycemia [R73.9]  DKA, type 2, not at goal [facility]) [E11.10]  Hypotension, unspecified hypotension type [I95.9]  Diabetic ketoacidosis without coma associated with diabetes mellitus due to underlying condition ([facility] [address] [E08.10]     Date / Time: 12/12/2021  9:08 AM    Patient Admission Status: Inpatient    Current PCP: [facility] [address] [place] [address]    Chart Reviewed: Yes  Patient/ Family Interviewed: Yes    Emergency Contacts:  Extended Emergency Contact Information  Primary Emergency Contact: Jc Griffith  [place] Phone: [phone]  Relation: Parent    Advance Directives:   Code Status: Full Code    Financial  Payor: MEDICARE / Plan: MEDICARE PART A AND B / Product Type: *No Product type* /     Pharmacy  No Pharmacies Listed    ADLS  Independent PTA  Support Systems: Children    HOUSING  Home Environment: APT  Steps: 9STE    DISCHARGE PLAN:  Disposition: TBD.  Pt has been to [facility] in the past    Transportation PLAN for discharge: TBD    Sherrill Walter RN  Case Management  [phone] no...

## 2024-10-07 NOTE — ED PROVIDER NOTE - CLINICAL SUMMARY MEDICAL DECISION MAKING FREE TEXT BOX
74-year-old male, history as above, presenting with right flank pain after being diagnosed with a obstructing right renal stone on 10/4 at [facility].  Review of discharge paperwork with results from [facility] confirms patient has a 0.3 cm stone in right ureter causing mild hydronephrosis.  UA was negative at that time, creatinine was 1.34.  On initial evaluation, patient's vital signs are nonactionable but he appears uncomfortable, pacing in the hallway, clutching the right side of his abdomen.  Concern for progressive obstruction, versus infected stone.  Plan repeat labs, CT abdomen pelvis, analgesia with morphine, Toradol, IV fluids, reassess.

## 2024-10-08 ENCOUNTER — TRANSCRIPTION ENCOUNTER (OUTPATIENT)
Age: 74
End: 2024-10-08

## 2024-10-08 ENCOUNTER — RESULT REVIEW (OUTPATIENT)
Age: 74
End: 2024-10-08

## 2024-10-08 VITALS
DIASTOLIC BLOOD PRESSURE: 82 MMHG | RESPIRATION RATE: 18 BRPM | SYSTOLIC BLOOD PRESSURE: 145 MMHG | TEMPERATURE: 98 F | HEART RATE: 79 BPM | OXYGEN SATURATION: 95 %

## 2024-10-08 LAB
ANION GAP SERPL CALC-SCNC: 12 MMOL/L — SIGNIFICANT CHANGE UP (ref 5–17)
ANION GAP SERPL CALC-SCNC: 13 MMOL/L — SIGNIFICANT CHANGE UP (ref 5–17)
BUN SERPL-MCNC: 20 MG/DL — SIGNIFICANT CHANGE UP (ref 7–23)
BUN SERPL-MCNC: 24 MG/DL — HIGH (ref 7–23)
CALCIUM SERPL-MCNC: 8.9 MG/DL — SIGNIFICANT CHANGE UP (ref 8.4–10.5)
CALCIUM SERPL-MCNC: 9.5 MG/DL — SIGNIFICANT CHANGE UP (ref 8.4–10.5)
CHLORIDE SERPL-SCNC: 101 MMOL/L — SIGNIFICANT CHANGE UP (ref 96–108)
CHLORIDE SERPL-SCNC: 99 MMOL/L — SIGNIFICANT CHANGE UP (ref 96–108)
CO2 SERPL-SCNC: 24 MMOL/L — SIGNIFICANT CHANGE UP (ref 22–31)
CO2 SERPL-SCNC: 25 MMOL/L — SIGNIFICANT CHANGE UP (ref 22–31)
CREAT SERPL-MCNC: 2.08 MG/DL — HIGH (ref 0.5–1.3)
CREAT SERPL-MCNC: 2.69 MG/DL — HIGH (ref 0.5–1.3)
CULTURE RESULTS: SIGNIFICANT CHANGE UP
EGFR: 24 ML/MIN/1.73M2 — LOW
EGFR: 33 ML/MIN/1.73M2 — LOW
GLUCOSE SERPL-MCNC: 112 MG/DL — HIGH (ref 70–99)
GLUCOSE SERPL-MCNC: 219 MG/DL — HIGH (ref 70–99)
HCT VFR BLD CALC: 41.3 % — SIGNIFICANT CHANGE UP (ref 39–50)
HGB BLD-MCNC: 13.6 G/DL — SIGNIFICANT CHANGE UP (ref 13–17)
MCHC RBC-ENTMCNC: 29.6 PG — SIGNIFICANT CHANGE UP (ref 27–34)
MCHC RBC-ENTMCNC: 32.9 GM/DL — SIGNIFICANT CHANGE UP (ref 32–36)
MCV RBC AUTO: 90 FL — SIGNIFICANT CHANGE UP (ref 80–100)
NRBC # BLD: 0 /100 WBCS — SIGNIFICANT CHANGE UP (ref 0–0)
PLATELET # BLD AUTO: 173 K/UL — SIGNIFICANT CHANGE UP (ref 150–400)
POTASSIUM SERPL-MCNC: 4.2 MMOL/L — SIGNIFICANT CHANGE UP (ref 3.5–5.3)
POTASSIUM SERPL-MCNC: 4.4 MMOL/L — SIGNIFICANT CHANGE UP (ref 3.5–5.3)
POTASSIUM SERPL-SCNC: 4.2 MMOL/L — SIGNIFICANT CHANGE UP (ref 3.5–5.3)
POTASSIUM SERPL-SCNC: 4.4 MMOL/L — SIGNIFICANT CHANGE UP (ref 3.5–5.3)
RBC # BLD: 4.59 M/UL — SIGNIFICANT CHANGE UP (ref 4.2–5.8)
RBC # FLD: 12.9 % — SIGNIFICANT CHANGE UP (ref 10.3–14.5)
SODIUM SERPL-SCNC: 136 MMOL/L — SIGNIFICANT CHANGE UP (ref 135–145)
SODIUM SERPL-SCNC: 138 MMOL/L — SIGNIFICANT CHANGE UP (ref 135–145)
SPECIMEN SOURCE: SIGNIFICANT CHANGE UP
WBC # BLD: 7.84 K/UL — SIGNIFICANT CHANGE UP (ref 3.8–10.5)
WBC # FLD AUTO: 7.84 K/UL — SIGNIFICANT CHANGE UP (ref 3.8–10.5)

## 2024-10-08 PROCEDURE — C1769: CPT

## 2024-10-08 PROCEDURE — C9399: CPT

## 2024-10-08 PROCEDURE — 82947 ASSAY GLUCOSE BLOOD QUANT: CPT

## 2024-10-08 PROCEDURE — 85025 COMPLETE CBC W/AUTO DIFF WBC: CPT

## 2024-10-08 PROCEDURE — 84295 ASSAY OF SERUM SODIUM: CPT

## 2024-10-08 PROCEDURE — 96374 THER/PROPH/DIAG INJ IV PUSH: CPT

## 2024-10-08 PROCEDURE — 71045 X-RAY EXAM CHEST 1 VIEW: CPT

## 2024-10-08 PROCEDURE — 52356 CYSTO/URETERO W/LITHOTRIPSY: CPT | Mod: RT

## 2024-10-08 PROCEDURE — 80053 COMPREHEN METABOLIC PANEL: CPT

## 2024-10-08 PROCEDURE — 85014 HEMATOCRIT: CPT

## 2024-10-08 PROCEDURE — 82435 ASSAY OF BLOOD CHLORIDE: CPT

## 2024-10-08 PROCEDURE — 88300 SURGICAL PATH GROSS: CPT | Mod: 26

## 2024-10-08 PROCEDURE — 83690 ASSAY OF LIPASE: CPT

## 2024-10-08 PROCEDURE — 74177 CT ABD & PELVIS W/CONTRAST: CPT | Mod: MC

## 2024-10-08 PROCEDURE — 85018 HEMOGLOBIN: CPT

## 2024-10-08 PROCEDURE — 82330 ASSAY OF CALCIUM: CPT

## 2024-10-08 PROCEDURE — C2617: CPT

## 2024-10-08 PROCEDURE — 84132 ASSAY OF SERUM POTASSIUM: CPT

## 2024-10-08 PROCEDURE — C1758: CPT

## 2024-10-08 PROCEDURE — 81001 URINALYSIS AUTO W/SCOPE: CPT

## 2024-10-08 PROCEDURE — 80048 BASIC METABOLIC PNL TOTAL CA: CPT

## 2024-10-08 PROCEDURE — 76000 FLUOROSCOPY <1 HR PHYS/QHP: CPT

## 2024-10-08 PROCEDURE — 96375 TX/PRO/DX INJ NEW DRUG ADDON: CPT

## 2024-10-08 PROCEDURE — 88300 SURGICAL PATH GROSS: CPT

## 2024-10-08 PROCEDURE — 87086 URINE CULTURE/COLONY COUNT: CPT

## 2024-10-08 PROCEDURE — 83605 ASSAY OF LACTIC ACID: CPT

## 2024-10-08 PROCEDURE — C1889: CPT

## 2024-10-08 PROCEDURE — 99285 EMERGENCY DEPT VISIT HI MDM: CPT | Mod: 25

## 2024-10-08 PROCEDURE — 85027 COMPLETE CBC AUTOMATED: CPT

## 2024-10-08 PROCEDURE — 82365 CALCULUS SPECTROSCOPY: CPT

## 2024-10-08 PROCEDURE — 36415 COLL VENOUS BLD VENIPUNCTURE: CPT

## 2024-10-08 PROCEDURE — 82803 BLOOD GASES ANY COMBINATION: CPT

## 2024-10-08 DEVICE — LASER FIBER SOLTIVE 200 BALL TIP
Type: IMPLANTABLE DEVICE | Site: RIGHT | Status: NON-FUNCTIONAL
Removed: 2024-10-08

## 2024-10-08 DEVICE — STENT URET INLAY OPTIMA 6FRX26CM
Type: IMPLANTABLE DEVICE | Site: RIGHT | Status: NON-FUNCTIONAL
Removed: 2024-10-08

## 2024-10-08 DEVICE — URETERAL CATH OPEN END 5FR 70CM
Type: IMPLANTABLE DEVICE | Site: RIGHT | Status: NON-FUNCTIONAL
Removed: 2024-10-08

## 2024-10-08 DEVICE — GUIDEWIRE SENSOR DUAL-FLEX NITINOL STRAIGHT .035" X 150CM
Type: IMPLANTABLE DEVICE | Site: RIGHT | Status: NON-FUNCTIONAL
Removed: 2024-10-08

## 2024-10-08 DEVICE — STONE BASKET ZEROTIP NITINOL 4-WIRE 1.9FR 120CM X 12MM
Type: IMPLANTABLE DEVICE | Site: RIGHT | Status: NON-FUNCTIONAL
Removed: 2024-10-08

## 2024-10-08 RX ORDER — TAMSULOSIN HCL 0.4 MG
0.4 CAPSULE ORAL AT BEDTIME
Refills: 0 | Status: DISCONTINUED | OUTPATIENT
Start: 2024-10-08 | End: 2024-10-08

## 2024-10-08 RX ORDER — ACETAMINOPHEN 325 MG
975 TABLET ORAL EVERY 6 HOURS
Refills: 0 | Status: DISCONTINUED | OUTPATIENT
Start: 2024-10-08 | End: 2024-10-08

## 2024-10-08 RX ORDER — ASPIRIN 325 MG
81 TABLET ORAL DAILY
Refills: 0 | Status: DISCONTINUED | OUTPATIENT
Start: 2024-10-08 | End: 2024-10-08

## 2024-10-08 RX ORDER — TAMSULOSIN HCL 0.4 MG
1 CAPSULE ORAL
Qty: 0 | Refills: 0 | DISCHARGE
Start: 2024-10-08

## 2024-10-08 RX ORDER — CEFTRIAXONE SODIUM 1 G
1000 VIAL (EA) INJECTION EVERY 24 HOURS
Refills: 0 | Status: DISCONTINUED | OUTPATIENT
Start: 2024-10-09 | End: 2024-10-08

## 2024-10-08 RX ORDER — ASPIRIN 325 MG
1 TABLET ORAL
Qty: 0 | Refills: 0 | DISCHARGE
Start: 2024-10-08

## 2024-10-08 RX ORDER — ATORVASTATIN CALCIUM 10 MG/1
1 TABLET, FILM COATED ORAL
Qty: 0 | Refills: 0 | DISCHARGE
Start: 2024-10-08

## 2024-10-08 RX ORDER — LOSARTAN POTASSIUM 100 MG/1
1 TABLET, FILM COATED ORAL
Qty: 0 | Refills: 0 | DISCHARGE
Start: 2024-10-08

## 2024-10-08 RX ORDER — CEFTRIAXONE SODIUM 1 G
VIAL (EA) INJECTION
Refills: 0 | Status: DISCONTINUED | OUTPATIENT
Start: 2024-10-08 | End: 2024-10-08

## 2024-10-08 RX ORDER — CEFTRIAXONE SODIUM 1 G
1000 VIAL (EA) INJECTION EVERY 24 HOURS
Refills: 0 | Status: CANCELLED | OUTPATIENT
Start: 2024-10-09 | End: 2024-10-08

## 2024-10-08 RX ORDER — SENNOSIDES 8.6 MG
2 TABLET ORAL AT BEDTIME
Refills: 0 | Status: DISCONTINUED | OUTPATIENT
Start: 2024-10-08 | End: 2024-10-08

## 2024-10-08 RX ORDER — ATORVASTATIN CALCIUM 10 MG/1
40 TABLET, FILM COATED ORAL AT BEDTIME
Refills: 0 | Status: DISCONTINUED | OUTPATIENT
Start: 2024-10-08 | End: 2024-10-08

## 2024-10-08 RX ORDER — LOSARTAN POTASSIUM 100 MG/1
25 TABLET, FILM COATED ORAL DAILY
Refills: 0 | Status: DISCONTINUED | OUTPATIENT
Start: 2024-10-08 | End: 2024-10-08

## 2024-10-08 RX ORDER — CEFTRIAXONE SODIUM 1 G
1000 VIAL (EA) INJECTION ONCE
Refills: 0 | Status: COMPLETED | OUTPATIENT
Start: 2024-10-08 | End: 2024-10-08

## 2024-10-08 RX ORDER — SODIUM CHLORIDE IRRIG SOLUTION 0.9 %
1000 SOLUTION, IRRIGATION IRRIGATION
Refills: 0 | Status: DISCONTINUED | OUTPATIENT
Start: 2024-10-08 | End: 2024-10-08

## 2024-10-08 RX ORDER — HYDRALAZINE HYDROCHLORIDE 100 MG/1
5 TABLET ORAL ONCE
Refills: 0 | Status: COMPLETED | OUTPATIENT
Start: 2024-10-08 | End: 2024-10-08

## 2024-10-08 RX ADMIN — Medication 81 MILLIGRAM(S): at 11:55

## 2024-10-08 RX ADMIN — Medication 100 MILLIGRAM(S): at 06:56

## 2024-10-08 RX ADMIN — HYDRALAZINE HYDROCHLORIDE 5 MILLIGRAM(S): 100 TABLET ORAL at 16:41

## 2024-10-08 RX ADMIN — OXYCODONE HYDROCHLORIDE 10 MILLIGRAM(S): 30 TABLET, FILM COATED, EXTENDED RELEASE ORAL at 02:29

## 2024-10-08 RX ADMIN — OXYCODONE HYDROCHLORIDE 10 MILLIGRAM(S): 30 TABLET, FILM COATED, EXTENDED RELEASE ORAL at 01:59

## 2024-10-08 RX ADMIN — Medication 4 MILLIGRAM(S): at 10:11

## 2024-10-08 RX ADMIN — LOSARTAN POTASSIUM 25 MILLIGRAM(S): 100 TABLET, FILM COATED ORAL at 06:17

## 2024-10-08 RX ADMIN — Medication 5000 UNIT(S): at 06:18

## 2024-10-08 NOTE — DISCHARGE NOTE PROVIDER - NSDCFUADDINST_GEN_ALL_CORE_FT
STENT: You may have an internal stent (a hollow tube that runs from the kidney to your bladder) after your procedure, which helps urine drain from the kidney to your bladder. Some patients experience urinary frequency, burning, or even back pain (especially with urination). These sensations will gradually get better. Increasing your fluid intake can also improve these symptoms. While the stent is in place, your urine may continue to be bloody. This stent is temporary and must be removed by your urologist as an outpatient with in 3 months unless otherwise specified. If your stent is on a string, it is secured to your leg or genitalia with an adhesive bandage. Do not pull on the string, do not remove the bandage, do not insert anything intravaginally/intraurethrally, and do not engage in sexual intercourse until after the stent is removed at your post-operative appointment.  GENERAL: It is common to have blood in your urine after your procedure. It may be pink or even red; inform your doctor if you have a significant amount of clot in the urine or if you are unable to void at all. The urine may clear and then become bloody again especially as you are more physically active.  BATHING: You may shower or bathe.  DIET: You may resume your regular diet and regular medication regimen.  PAIN: You may take Tylenol (acetaminophen) 650-975mg and/or Motrin (ibuprofen) 400-600mg, both available over the counter, for pain every 6 hours as needed. Do not exceed 4000mg of Tylenol (acetaminophen) daily. You may alternate these medications such that you take one or the other every 3 hours for around the clock pain coverage. If you have a stent, the following medications may have been sent to your pharmacy for stent related discomfort: Flomax (tamsulosin) 0.4mg at bedtime until stent removed, Ditropan (oxybutynin) 5mg every 8 hours as needed for bladder spasms, and Pyridium (phenasopyridine) 100mg every 8 hours as needed for kidney/bladder discomfort for max 3 days (Pyridium will make your urine orange).  ANTIBIOTICS: You may be given a prescription for an antibiotic, please take this medication as instructed and be sure to complete the entire course.  STOOL SOFTENERS: Do not allow yourself to become constipated as straining may cause bleeding. Take stool softeners or a laxative (ex. Miralax, Colace, Senokot, ExLax, etc), available over the counter, if needed.  ACTIVITY: No heavy lifting or strenuous exercise until you are evaluated at your post-operative appointment. Otherwise, you may return to your usual level of physical activity.  ANTICOAGULATION: If you are taking any blood thinning medications, please discuss with your urologist prior to restarting these medications unless otherwise specified.  FOLLOW-UP: If you did not already schedule your post-operative appointment, please call your urologist to schedule and follow-up appointment.  CALL YOUR UROLOGIST IF: You have any bleeding that does not stop, inability to void >8 hours, fever over 100.4 F, chills, persistent nausea/vomiting, changes in your incision concerning for infection, or if your pain is not controlled on your discharge pain medications.

## 2024-10-08 NOTE — DISCHARGE NOTE PROVIDER - CARE PROVIDER_API CALL
Charlette Ivey  Urology  [address] [place] [id number]  Phone: [phone]  Fax: [phone]  Follow Up Time:

## 2024-10-08 NOTE — DISCHARGE NOTE PROVIDER - NSDCMRMEDTOKEN_GEN_ALL_CORE_FT
aspirin 81 mg oral tablet, chewable: 1 tab(s) orally once a day  atorvastatin 40 mg oral tablet: 1 tab(s) orally once a day  Flomax 0.4 mg oral capsule: 1 cap(s) orally once a day (at bedtime)  losartan 25 mg oral tablet: 1 tab(s) orally once a day   aspirin 81 mg oral tablet, chewable: 1 tab(s) orally once a day  atorvastatin 40 mg oral tablet: 1 tab(s) orally once a day (at bedtime)  losartan 25 mg oral tablet: 1 tab(s) orally once a day  tamsulosin 0.4 mg oral capsule: 1 cap(s) orally once a day (at bedtime)

## 2024-10-08 NOTE — DISCHARGE NOTE PROVIDER - NSDCCPCAREPLAN_GEN_ALL_CORE_FT
PRINCIPAL DISCHARGE DIAGNOSIS  Diagnosis: Right renal stone  Assessment and Plan of Treatment: Activity as tolerated. Avoid heavy lifting, no heavy exercise or straining over 15 lbs for the next two weeks.  Driving--Please do not drive until your pain is well controlled and you do not need to take pain medications.   Call the office if you have fever greater than 101, difficulty urinating, pain not relieved with pain medication, nausea/vomiting.  Recommend colace/senna to avoid constipation. Avoid bearing down/straining for a bowel movement.  Continue to take your home medications. Please follow up with your primary care physician regarding your hospitalization within 2 weeks after your discharge.        SECONDARY DISCHARGE DIAGNOSES  Diagnosis: TA (acute kidney injury)  Assessment and Plan of Treatment: Follow up with your urologist after your procedure. Increase hydration.

## 2024-10-08 NOTE — PROGRESS NOTE ADULT - SUBJECTIVE AND OBJECTIVE BOX
UROLOGY DAILY PROGRESS NOTE:     Subjective: Patient seen and examined at bedside. No overnight events.       Objective:  Vital signs  T(F): , Max: 98.7 (10-07-24 @ 21:14)  HR: 79 (10-08-24 @ 05:16)  BP: 159/81 (10-08-24 @ 05:16)  SpO2: 93% (10-08-24 @ 05:16)  Wt(kg): --    I&O's Summary    07 Oct 2024 07:01  -  08 Oct 2024 07:00  --------------------------------------------------------  IN: 3200 mL / OUT: 2025 mL / NET: 1175 mL        Gen: NAD  Pulm: No respiratory distress, no subcostal retractions  Abd: Soft, NT, ND  : voiding    Labs:  10-08  7.84  / 41.3  /2.69   10-06  13.14 / 43.8  /2.40                           13.6   7.84  )-----------( 173      ( 08 Oct 2024 07:01 )             41.3     10-08    136  |  99  |  24[H]  ----------------------------<  112[H]  4.2   |  24  |  2.69[H]    Ca    8.9      08 Oct 2024 07:01    TPro  7.1  /  Alb  4.0  /  TBili  0.8  /  DBili  x   /  AST  18  /  ALT  15  /  AlkPhos  88  10-06        Urine Cx:  Culture - Urine (10.06.24 @ 21:21)   Specimen Source: Clean Catch Clean Catch (Midstream)  Culture Results:   <10,000 CFU/mL Normal Urogenital Noa

## 2024-10-08 NOTE — PROGRESS NOTE ADULT - ASSESSMENT
75 y/o M found to have a 3mm R distal ureteral stone with mild hydro, TA and poorly pain controlled    Plan:  -OR today for URS  -consented  -medical clearance obtained  -npo/IVF  -CTX  -dc planning after procedure

## 2024-10-08 NOTE — DISCHARGE NOTE NURSING/CASE MANAGEMENT/SOCIAL WORK - MODE OF TRANSPORTATION
PT DAILY TREATMENT NOTE/CERVICAL EVAL3-16    Patient Name: Becki Negron  Date:2017  : 1978  [x]  Patient  Verified  Payor: [place]ICAID [place] / Plan: 1500 / Product Type: Medicaid /    In time:2:44  Out time:3:50  Total Treatment Time (min): 64  Visit #: 1     Treatment Area: Spinal stenosis, cervical region [M48.02]    SUBJECTIVE  Pain Level (0-10 scale): 8.5/10  []constant []intermittent []improving [x]worsening []no change since onset    Any medication changes, allergies to medications, adverse drug reactions, diagnosis change, or new procedure performed?: [x] No    [] Yes (see summary sheet for update)  Subjective functional status/changes:       Pt reports that his neurologist wants him to start with conservative treatment but they will do surgery if it doesn't work. He had full body spasms 7-8 years ago that happened frequently. He no longer has the full body spasms, but he continues to have spasms in his upper thoracic region. He is also very tender is his lower thoracic region, and light touch if this area causes the spasms. He started walking with a SPC 7-8 years ago d/t back/hip pain. He was in physical therapy for his back/hip which was helping, but he had to stop d/t financial restrictions. He is not able to afford therapy now as he hasn't worked for 10 years, but he is applying for financial assistance.      Barriers: []pain [x]financial []time []transportation []other  Motivation: high  Substance use: []Alcohol [x]Tobacco []other:  Cognition: A & O x 4    Other:    OBJECTIVE/EXAMINATION    36 min Eval    20 min Therapeutic Activity:  [x]  See flow sheet :   Rationale: Educated patient regarding findings of evaluation, anatomy of spine using spine model, degenerative process, new therex technique and purpose, purpose of therapy, and therapy plan in order to ensure pt understanding/compliance with therapy        With   [] TE   [] TA   [] neuro   [] Ambulatory other: Patient Education: [x] Review HEP    [] Progressed/Changed HEP based on:   [] positioning   [] body mechanics   [] transfers   [] heat/ice application    [] other:      Other Objective/Functional Measures:     /83 taken manually prior to therapy     Physical Therapy Evaluation Cervical Spine     SUBJECTIVE     General Health:  Red Flags Indicated? [] Yes    [] No  [x] Yes [] No Recent weight change (If yes, due to dieting? [] Yes  [] No) - appetite varies and recent death in the family    [] Yes [x] No Persistent cough  [x] Yes [] No Unremitting pain at night  [] Yes [x] No Dizziness - very rare, occurs with getting up too quickly  [] Yes [x] No Blurred vision  [x] Yes [] No Hands more cold or painful in cold weather  [x] Yes [] No Ringing in ears - randomly   [] Yes [x] No Difficulty swallowing  [] Yes [x] No Dysfunction of bowel or bladder  [x] Yes [] No Recent illness within past 3 weeks (i.e, cold, flu) - currently has a sinus infection    [] Yes [x] No Jaw pain    Past History/Treatments:    Diagnostic Tests: [] Lab work [] X-rays    [] CT [x] MRI     [] Other:  Results: per electronic medical record 6/5/17  EXAM:  MRI of the cervical spine      COMPARISON:  None     TECHNIQUE: Sagittal T1, STIR and T2, with axial T2 MR images of the cervical  spine were obtained.     HISTORY:  Left-sided neck pain, left cervical radiculopathy and arm numbness      FINDINGS:       Normal alignment. Vertebral body heights are maintained. Multilevel endplate  osteophytes. No mild disc height loss C6/7. No suspicious bone marrow lesion. No  prevertebral soft tissue abnormality. Normal craniocervical junction. Normal  cervical cord in signal and morphology.     Correlation of sagittal and axial images at the level of the discs demonstrates  the following:   C2-C3: No significant disc pathology. Patent central canal and neural foramina.   C3-C4: Mild posterior disc osteophyte with asymmetric right uncovertebral disc  osteophyte resulting in mild right foraminal stenosis. Patent central canal.  Mild right facet hypertrophy. C4-C5:Mild posterior disc osteophyte with right greater than left mild  uncovertebral spurring. Mild right facet hypertrophy. Mild right foraminal  stenosis. Patent central canal.  C5-C6: Mild disc osteophyte complex and prominent uncovertebral spurring. No  facet hypertrophy. Mild right and moderate left foraminal stenosis. Patent  central canal  C6-C7: Moderate disc osteophyte complex effacing ventral CSF space. Mild  uncovertebral spurring. Mild central canal stenosis. Mild right and mild to  moderate left foraminal stenosis. C7-T1: No significant disc pathology. Patent central canal and foramina  T1-T2:  No significant disc pathology. Patent central canal and foramina.     Remainder of the upper thoracic spine to the level of T5/T6 demonstrate  unremarkable disc space patent central canal and foramina.     Incidentally noted benign-appearing mucosal sinus disease in the right maxillary  sinus.     IMPRESSION  Impression:     1. Multilevel mild degenerative changes throughout the cervical spine most  pronounced at C6/7 disc where there is mild central canal stenosis. 2. Multilevel foraminal stenosis most pronounced left-sided foraminal stenosis  at C6/7 and C5/6 as described, likely cause of left cervical radiculopathy. Functional Status  Prior level of function:  Present functional limitations:  What position do you sleep in?:    Headaches: Do you have headaches? [x] Yes   [] No  How often do you get headaches? 2-3 per month  How long does the headache last? days  What aggravates it? What relieves it? \"shifting things back into place\"   Does the headache coincide with any other symptoms (visual disturbances, light sensitivity)? Where is the headache? Suboccipital   Does it change locations? Migraines are in the temporal region  Other: history of migraines since he was 5. OBJECTIVE  Posture: [] WNL  Head Position:  Shoulder/Scapular Position:  C-Kyphosis:  [] increased   [x] decreased   C-Lordosis:   [] increased   [x] decreased  T-Kyphosis:  [] increased   [x] decreased  T-Lordosis:   [] increased   [] decreased     Cervical Retraction: [x] WNL    [] Abnormal:    Shoulder/Scapular Screen: [] WNL    [x] Abnormal: R scapula resting in abducted position, minor winging position at rest B, decreased upward rotation of L scapula with shoulder elevation     Active Movements: [] N/A   [] Too acute   [] Other:  ROM AROM (dgrs) % PROM Comments:pain, area   Forward flexion 55     Extension 39     SB right 20     SB left  18     Rotation right 46     Rotation left 56       Shoulder elevation  L 138 dgrs  R 141 dgrs     Palpation:  [] Min  [x] Mod  [] Severe    Location: spinous processes C5-C6, T1-T7  Patient declined palpation in caudal progression below T7    Neuro Screen (myotome/dematome/felexes): [] WNL    Myotome Screen  Sh Flex 4/5 B  Sh Abd 4/5 B  Sh IR 4+/5 B  Sh ER 4+/5 B  Elbow Flex 4+/5 B  Elbow Ext 4+/5 B  Wrist Flex 4+/5 B  Wrist Ext 4+/5   Strength: R 64#, L 54 #    Reflexes  Biceps 1+ B  Brachioradialis 1+ B  Triceps 1+ b     Upper Limb Tension Tests: [] N/A  Not assessed d/t time constraint     Special Tests:  Cervical:        Vertebral Artery:  [] R    [] L    [] +    [] -       Alar Ligament: [x] R    [] L    [] +    [x] -       Transverse Lig: [x] R    [] L    [] +    [x] -       Spurling's:  [x] R    [] L    [] +    [x] -       Distraction:  [x] R    [] L    [] +    [x] -       Compression: [x] R    [] L    [] +    [x] -    Muscle Flexibility: [] N/A   Scalenes: [] WNL    [x] Tight    [x] R    [x] L   Upper Trap: [] WNL    [x] Tight    [x] R    [x] L   Levator: [] WNL    [x] Tight    [x] R    [x] L   Pect.  Minor: [] WNL    [x] Tight    [x] R    [x] L    Global Muscular Weakness: [] N/A   Lower Trap: weak   Middle Trap: weak   Serratus Ant: weak    Not formally assessed d/t time constraint    Other tests/comments:    Encouraged pt to follow up with MD regarding PT referral for his back if his Care Card is approved  Pt was very talkative during evaluation, limiting further evaluation/treatment  HEP was verbally reviewed d/t time constraint with pt performing 1 of each exercise to assess for proper form  No increased pain with therapy    Pain Level (0-10 scale) post treatment: 8.5/10    ASSESSMENT/Changes in Function: See POC    Patient will continue to benefit from skilled PT services to modify and progress therapeutic interventions, address functional mobility deficits, address ROM deficits, address strength deficits, analyze and address soft tissue restrictions, analyze and cue movement patterns, assess and modify postural abnormalities and instruct in home and community integration to attain remaining goals.      [x]  See Plan of Care  []  See progress note/recertification  []  See Discharge Summary         Progress towards goals / Updated goals:  See POC    PLAN  [x]  Upgrade activities as tolerated     []  Continue plan of care  [x]  Update interventions per flow sheet       []  Discharge due to:_  []  Other:_      Frances Salmeron, PT 7/11/2017  2:44 PM

## 2024-10-08 NOTE — DISCHARGE NOTE PROVIDER - HOSPITAL COURSE
74 year old male with PMHx of CAD s/p stents x 2, HTN, HLD, BPH presented to [facility] ED for right flank pain.  CT abd pelvis on 10/6/24 showed a 3mm right distal ureteral stone. He was admitted for TA and pain control.    On 10/8/2024, patient underwent right ureteroscopy for definitive stone management.  Patient tolerated operation well and there were no post-operative complications identified. Patient remained hemodynamically stable in the PACU and transferred to the surgical floor. Diet was restarted and advanced as tolerated. Pain control was transitioned from IV to PO pain meds.    At this time, patient is currently ambulating, voiding, tolerating a regular diet. Pain well controlled on PO pain meds. Patient felt safe with being discharged, and understood and agreed with plan. Per attending Dr. Ivey, patient is stable for discharge to home with outpatient follow up. Patient to be discharged with ****MEDS***. Patient instructed to call office to make follow up appointment.   74 year old male with PMHx of CAD s/p stents x 2, HTN, HLD, BPH presented to [facility] ED for right flank pain.  CT abd pelvis on 10/6/24 showed a 3mm right distal ureteral stone. He was admitted for TA and pain control.    On 10/8/2024, patient underwent right ureteroscopy for definitive stone management.  Patient tolerated operation well and there were no post-operative complications identified. Patient remained hemodynamically stable in the PACU and transferred to the surgical floor. Diet was restarted and advanced as tolerated. Pain control was transitioned from IV to PO pain meds.    At this time, patient is currently ambulating, voiding, tolerating a regular diet. Pain well controlled on PO pain meds. Patient felt safe with being discharged, and understood and agreed with plan. Per attending Dr. Ivey, patient is stable for discharge to home with outpatient follow up. Patient instructed to call office to make follow up appointment in 1 week for stent removal.

## 2024-10-08 NOTE — DISCHARGE NOTE NURSING/CASE MANAGEMENT/SOCIAL WORK - PATIENT PORTAL LINK FT
You can access the FollowMyHealth Patient Portal offered by [facility] by registering at the following website: [email or web address]. By joining [facility]’s FollowMyHealth portal, you will also be able to view your health information using other applications (apps) compatible with our system.

## 2024-10-11 LAB — SURGICAL PATHOLOGY STUDY: SIGNIFICANT CHANGE UP

## 2024-10-16 ENCOUNTER — APPOINTMENT (OUTPATIENT)
Dept: UROLOGY | Facility: CLINIC | Age: 74
End: 2024-10-16
Payer: MEDICARE

## 2024-10-16 VITALS
SYSTOLIC BLOOD PRESSURE: 165 MMHG | OXYGEN SATURATION: 97 % | BODY MASS INDEX: 27.89 KG/M2 | DIASTOLIC BLOOD PRESSURE: 85 MMHG | RESPIRATION RATE: 16 BRPM | HEART RATE: 84 BPM | WEIGHT: 184 LBS | HEIGHT: 68 IN

## 2024-10-16 DIAGNOSIS — N20.0 CALCULUS OF KIDNEY: ICD-10-CM

## 2024-10-16 PROCEDURE — 52310 CYSTOSCOPY AND TREATMENT: CPT

## 2024-10-22 RX ORDER — ATORVASTATIN CALCIUM 10 MG/1
1 TABLET, FILM COATED ORAL
Refills: 0 | DISCHARGE

## 2024-10-22 RX ORDER — LOSARTAN POTASSIUM 100 MG/1
1 TABLET, FILM COATED ORAL
Refills: 0 | DISCHARGE

## 2024-10-22 RX ORDER — TAMSULOSIN HCL 0.4 MG
1 CAPSULE ORAL
Refills: 0 | DISCHARGE

## 2024-10-30 NOTE — H&P ADULT - NSCORESITESY/N_GEN_A_CORE_RD
CC -   PDR and h/o RD    INTERVAL HISTORY -  last eye examination new eye pain and decreased vision OD. 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  History of recurrent vitreous hemorrhage right eye   Presented to [facility] 2019 with VH OU,     s/p cardiac Txp 7/2020 for severe ischemic cardiomyopathy  CKD, HTn, DM II      PAST OCULAR SURGERY  PPV/MS/retinectomy/SO OS 12/2020  CE/IOL OD 2/2023  PRP fill-in OD  8/2022 & 10/2023      RETINAL IMAGING:  OCT 9/30/24  OD - central DME mild worse, vit opacities worse, PVD, -> 340-> 319->350  OS - chronic ME and temporal atrophy     Fundus photos  Consistent with exam each eye       ASSESSMENT & PLAN    # PDR wtih DM II and DME OD   - s/p PRP last fill-in 10/2023   - given anti-VEGF for VH last injection 1/2023   - quiescent on DFE      # recurrent VH right eye    - prior onset 2021 Tx with Avastin, resolved   - new onset 9/29/24   - last Avastin 1/2023   - advise repeat Avastin 10/1/24; 10/30/24      - r/b/a IVT anti-VEGF d/w patient   - infection, blindness, retinal detachment, cataract, bleeding, need for emergency procedures or surgery   - resident or fellow performance of injection   - off-label use of Avastin      # DME OD   - , used ketorolac in past   - anti-VEGF for VH in 2022 last injection 1/2023 Avastin   - worse on 7/2024 but better without Tx 8/2024   - worse today but decr VA likely 2/2 VH   - monitor   - Avastin for VH   - may need injections for DME if worsens      # PDR OS   - s/p PRP in 2019   - s/p PPV in 2020 for funnel RD after LTFU   - Avastin for NVG last 2022     - quiescent    # h/o RD OS   - s/p repair 2020 with oil   - retina flat    # h/o NVI/NVG OS   - s/p Avastin last 2022    # Ocular HTN   - IOP 15/21   - continue latanoprost        # h/o cardiac Txp    PLAN: follow up in 6-8 weeks with fluorescein angiography transits right eye and Optical Coherence Tomography   ~~~~~~~~~~~~~~~~~~~~~~~~~~~~~~~~~~   Complete documentation of historical and exam elements from today's encounter can be found in the full encounter summary report (not reduplicated in this progress note).  I personally obtained the chief complaint(s) and history of present illness.  I confirmed and edited as necessary the review of systems, past medical/surgical history, family history, social history, and examination findings as documented by others; and I examined the patient myself.  I personally reviewed the relevant tests, images, and reports as documented above.  I formulated and edited as necessary the assessment and plan and discussed the findings and management plan with the patient and family and No resident or fellow assisted with the procedures performed.  I performed the procedures myself.    Triny Bunch MD  Professor of Ophthalmology  Vitreo-Retinal surgeon   Department of Ophthalmology and Visual Neurosciences   [facility]  Phone: [phone]   Fax: [phone]        No

## 2024-11-27 ENCOUNTER — APPOINTMENT (OUTPATIENT)
Dept: UROLOGY | Facility: CLINIC | Age: 74
End: 2024-11-27

## 2025-03-17 NOTE — PRE-OP CHECKLIST - RESPIRATORY RATE (BREATHS/MIN)
18 Plan: Discussed PDT vs Efudex. Patient states that he would us to just freeze at this time and declines PDT and Efudex after careful explanation. Render In Strict Bullet Format?: No Detail Level: Zone

## 2025-07-30 ENCOUNTER — EMERGENCY (EMERGENCY)
Facility: HOSPITAL | Age: 75
LOS: 1 days | End: 2025-07-30
Attending: EMERGENCY MEDICINE
Payer: COMMERCIAL

## 2025-07-30 ENCOUNTER — RESULT REVIEW (OUTPATIENT)
Age: 75
End: 2025-07-30

## 2025-07-30 VITALS
WEIGHT: 164.91 LBS | RESPIRATION RATE: 18 BRPM | OXYGEN SATURATION: 95 % | DIASTOLIC BLOOD PRESSURE: 78 MMHG | SYSTOLIC BLOOD PRESSURE: 197 MMHG | HEIGHT: 68 IN | HEART RATE: 81 BPM | TEMPERATURE: 98 F

## 2025-07-30 PROBLEM — E78.5 HYPERLIPIDEMIA, UNSPECIFIED: Chronic | Status: ACTIVE | Noted: 2024-10-08

## 2025-07-30 PROBLEM — I25.10 ATHEROSCLEROTIC HEART DISEASE OF NATIVE CORONARY ARTERY WITHOUT ANGINA PECTORIS: Chronic | Status: ACTIVE | Noted: 2024-10-08

## 2025-07-30 PROBLEM — I10 ESSENTIAL (PRIMARY) HYPERTENSION: Chronic | Status: ACTIVE | Noted: 2024-10-08

## 2025-07-30 PROBLEM — N40.0 BENIGN PROSTATIC HYPERPLASIA WITHOUT LOWER URINARY TRACT SYMPTOMS: Chronic | Status: ACTIVE | Noted: 2024-10-08

## 2025-07-30 LAB
ADD ON TEST-SPECIMEN IN LAB: SIGNIFICANT CHANGE UP
ALBUMIN SERPL ELPH-MCNC: 4.5 G/DL — SIGNIFICANT CHANGE UP (ref 3.3–5)
ALP SERPL-CCNC: 108 U/L — SIGNIFICANT CHANGE UP (ref 40–120)
ALT FLD-CCNC: 17 U/L — SIGNIFICANT CHANGE UP (ref 10–45)
ANION GAP SERPL CALC-SCNC: 17 MMOL/L — SIGNIFICANT CHANGE UP (ref 5–17)
APPEARANCE UR: CLEAR — SIGNIFICANT CHANGE UP
APTT BLD: 29.8 SEC — SIGNIFICANT CHANGE UP (ref 26.1–36.8)
AST SERPL-CCNC: 22 U/L — SIGNIFICANT CHANGE UP (ref 10–40)
BACTERIA # UR AUTO: NEGATIVE /HPF — SIGNIFICANT CHANGE UP
BASOPHILS # BLD AUTO: 0.06 K/UL — SIGNIFICANT CHANGE UP (ref 0–0.2)
BASOPHILS NFR BLD AUTO: 0.9 % — SIGNIFICANT CHANGE UP (ref 0–2)
BILIRUB SERPL-MCNC: 0.9 MG/DL — SIGNIFICANT CHANGE UP (ref 0.2–1.2)
BILIRUB UR-MCNC: NEGATIVE — SIGNIFICANT CHANGE UP
BUN SERPL-MCNC: 22 MG/DL — SIGNIFICANT CHANGE UP (ref 7–23)
CALCIUM SERPL-MCNC: 9.4 MG/DL — SIGNIFICANT CHANGE UP (ref 8.4–10.5)
CAST: 0 /LPF — SIGNIFICANT CHANGE UP (ref 0–4)
CHLORIDE SERPL-SCNC: 102 MMOL/L — SIGNIFICANT CHANGE UP (ref 96–108)
CO2 SERPL-SCNC: 21 MMOL/L — LOW (ref 22–31)
COLOR SPEC: YELLOW — SIGNIFICANT CHANGE UP
CREAT SERPL-MCNC: 1.28 MG/DL — SIGNIFICANT CHANGE UP (ref 0.5–1.3)
DIFF PNL FLD: NEGATIVE — SIGNIFICANT CHANGE UP
EGFR: 58 ML/MIN/1.73M2 — LOW
EGFR: 58 ML/MIN/1.73M2 — LOW
EOSINOPHIL # BLD AUTO: 0.1 K/UL — SIGNIFICANT CHANGE UP (ref 0–0.5)
EOSINOPHIL NFR BLD AUTO: 1.5 % — SIGNIFICANT CHANGE UP (ref 0–6)
ETHANOL SERPL-MCNC: <10 MG/DL — SIGNIFICANT CHANGE UP (ref 0–10)
GLUCOSE SERPL-MCNC: 124 MG/DL — HIGH (ref 70–99)
GLUCOSE UR QL: NEGATIVE MG/DL — SIGNIFICANT CHANGE UP
HCT VFR BLD CALC: 47.1 % — SIGNIFICANT CHANGE UP (ref 39–50)
HGB BLD-MCNC: 15.3 G/DL — SIGNIFICANT CHANGE UP (ref 13–17)
IMM GRANULOCYTES # BLD AUTO: 0.02 K/UL — SIGNIFICANT CHANGE UP (ref 0–0.07)
IMM GRANULOCYTES NFR BLD AUTO: 0.3 % — SIGNIFICANT CHANGE UP (ref 0–0.9)
INR BLD: 1.01 RATIO — SIGNIFICANT CHANGE UP (ref 0.85–1.16)
KETONES UR QL: NEGATIVE MG/DL — SIGNIFICANT CHANGE UP
LEUKOCYTE ESTERASE UR-ACNC: NEGATIVE — SIGNIFICANT CHANGE UP
LYMPHOCYTES # BLD AUTO: 1.23 K/UL — SIGNIFICANT CHANGE UP (ref 1–3.3)
LYMPHOCYTES NFR BLD AUTO: 19 % — SIGNIFICANT CHANGE UP (ref 13–44)
MCHC RBC-ENTMCNC: 28.8 PG — SIGNIFICANT CHANGE UP (ref 27–34)
MCHC RBC-ENTMCNC: 32.5 G/DL — SIGNIFICANT CHANGE UP (ref 32–36)
MCV RBC AUTO: 88.7 FL — SIGNIFICANT CHANGE UP (ref 80–100)
MONOCYTES # BLD AUTO: 0.67 K/UL — SIGNIFICANT CHANGE UP (ref 0–0.9)
MONOCYTES NFR BLD AUTO: 10.4 % — SIGNIFICANT CHANGE UP (ref 2–14)
NEUTROPHILS # BLD AUTO: 4.39 K/UL — SIGNIFICANT CHANGE UP (ref 1.8–7.4)
NEUTROPHILS NFR BLD AUTO: 67.9 % — SIGNIFICANT CHANGE UP (ref 43–77)
NITRITE UR-MCNC: NEGATIVE — SIGNIFICANT CHANGE UP
NRBC # BLD AUTO: 0 K/UL — SIGNIFICANT CHANGE UP (ref 0–0)
NRBC # FLD: 0 K/UL — SIGNIFICANT CHANGE UP (ref 0–0)
NRBC BLD AUTO-RTO: 0 /100 WBCS — SIGNIFICANT CHANGE UP (ref 0–0)
PH UR: 8 — SIGNIFICANT CHANGE UP (ref 5–8)
PLATELET # BLD AUTO: 199 K/UL — SIGNIFICANT CHANGE UP (ref 150–400)
PMV BLD: 10.9 FL — SIGNIFICANT CHANGE UP (ref 7–13)
POTASSIUM SERPL-MCNC: 4.1 MMOL/L — SIGNIFICANT CHANGE UP (ref 3.5–5.3)
POTASSIUM SERPL-SCNC: 4.1 MMOL/L — SIGNIFICANT CHANGE UP (ref 3.5–5.3)
PROT SERPL-MCNC: 7.3 G/DL — SIGNIFICANT CHANGE UP (ref 6–8.3)
PROT UR-MCNC: NEGATIVE MG/DL — SIGNIFICANT CHANGE UP
PROTHROM AB SERPL-ACNC: 11.5 SEC — SIGNIFICANT CHANGE UP (ref 9.9–13.4)
RBC # BLD: 5.31 M/UL — SIGNIFICANT CHANGE UP (ref 4.2–5.8)
RBC # FLD: 13 % — SIGNIFICANT CHANGE UP (ref 10.3–14.5)
RBC CASTS # UR COMP ASSIST: 0 /HPF — SIGNIFICANT CHANGE UP (ref 0–4)
SODIUM SERPL-SCNC: 140 MMOL/L — SIGNIFICANT CHANGE UP (ref 135–145)
SP GR SPEC: 1.02 — SIGNIFICANT CHANGE UP (ref 1–1.03)
SQUAMOUS # UR AUTO: 0 /HPF — SIGNIFICANT CHANGE UP (ref 0–5)
TROPONIN T, HIGH SENSITIVITY RESULT: 8 NG/L — SIGNIFICANT CHANGE UP (ref 0–51)
TROPONIN T, HIGH SENSITIVITY RESULT: 8 NG/L — SIGNIFICANT CHANGE UP (ref 0–51)
TROPONIN T, HIGH SENSITIVITY RESULT: 9 NG/L — SIGNIFICANT CHANGE UP (ref 0–51)
UROBILINOGEN FLD QL: 0.2 MG/DL — SIGNIFICANT CHANGE UP (ref 0.2–1)
WBC # BLD: 6.47 K/UL — SIGNIFICANT CHANGE UP (ref 3.8–10.5)
WBC # FLD AUTO: 6.47 K/UL — SIGNIFICANT CHANGE UP (ref 3.8–10.5)
WBC UR QL: 0 /HPF — SIGNIFICANT CHANGE UP (ref 0–5)

## 2025-07-30 PROCEDURE — 70498 CT ANGIOGRAPHY NECK: CPT

## 2025-07-30 PROCEDURE — 93308 TTE F-UP OR LMTD: CPT

## 2025-07-30 PROCEDURE — 82803 BLOOD GASES ANY COMBINATION: CPT

## 2025-07-30 PROCEDURE — 36415 COLL VENOUS BLD VENIPUNCTURE: CPT

## 2025-07-30 PROCEDURE — 99223 1ST HOSP IP/OBS HIGH 75: CPT | Mod: 25

## 2025-07-30 PROCEDURE — 84484 ASSAY OF TROPONIN QUANT: CPT

## 2025-07-30 PROCEDURE — 76376 3D RENDER W/INTRP POSTPROCES: CPT | Mod: 26

## 2025-07-30 PROCEDURE — 71045 X-RAY EXAM CHEST 1 VIEW: CPT

## 2025-07-30 PROCEDURE — 82435 ASSAY OF BLOOD CHLORIDE: CPT

## 2025-07-30 PROCEDURE — 83605 ASSAY OF LACTIC ACID: CPT

## 2025-07-30 PROCEDURE — 80307 DRUG TEST PRSMV CHEM ANLYZR: CPT

## 2025-07-30 PROCEDURE — 80053 COMPREHEN METABOLIC PANEL: CPT

## 2025-07-30 PROCEDURE — 85730 THROMBOPLASTIN TIME PARTIAL: CPT

## 2025-07-30 PROCEDURE — 70496 CT ANGIOGRAPHY HEAD: CPT | Mod: 26

## 2025-07-30 PROCEDURE — 70551 MRI BRAIN STEM W/O DYE: CPT | Mod: 26

## 2025-07-30 PROCEDURE — 85610 PROTHROMBIN TIME: CPT

## 2025-07-30 PROCEDURE — 93010 ELECTROCARDIOGRAM REPORT: CPT

## 2025-07-30 PROCEDURE — L9997: CPT

## 2025-07-30 PROCEDURE — 83036 HEMOGLOBIN GLYCOSYLATED A1C: CPT

## 2025-07-30 PROCEDURE — 70496 CT ANGIOGRAPHY HEAD: CPT

## 2025-07-30 PROCEDURE — 70498 CT ANGIOGRAPHY NECK: CPT | Mod: 26

## 2025-07-30 PROCEDURE — 85025 COMPLETE CBC W/AUTO DIFF WBC: CPT

## 2025-07-30 PROCEDURE — 82330 ASSAY OF CALCIUM: CPT

## 2025-07-30 PROCEDURE — 82947 ASSAY GLUCOSE BLOOD QUANT: CPT

## 2025-07-30 PROCEDURE — 82962 GLUCOSE BLOOD TEST: CPT

## 2025-07-30 PROCEDURE — 71045 X-RAY EXAM CHEST 1 VIEW: CPT | Mod: 26

## 2025-07-30 PROCEDURE — 84132 ASSAY OF SERUM POTASSIUM: CPT

## 2025-07-30 PROCEDURE — 70450 CT HEAD/BRAIN W/O DYE: CPT | Mod: 26,XU

## 2025-07-30 PROCEDURE — 84295 ASSAY OF SERUM SODIUM: CPT

## 2025-07-30 PROCEDURE — 93306 TTE W/DOPPLER COMPLETE: CPT | Mod: 26

## 2025-07-30 PROCEDURE — 70551 MRI BRAIN STEM W/O DYE: CPT

## 2025-07-30 PROCEDURE — 80061 LIPID PANEL: CPT

## 2025-07-30 PROCEDURE — 81001 URINALYSIS AUTO W/SCOPE: CPT

## 2025-07-30 PROCEDURE — 70450 CT HEAD/BRAIN W/O DYE: CPT

## 2025-07-30 PROCEDURE — 87086 URINE CULTURE/COLONY COUNT: CPT

## 2025-07-30 PROCEDURE — 85014 HEMATOCRIT: CPT

## 2025-07-30 PROCEDURE — 85018 HEMOGLOBIN: CPT

## 2025-07-30 PROCEDURE — 93356 MYOCRD STRAIN IMG SPCKL TRCK: CPT

## 2025-07-30 RX ORDER — CLOPIDOGREL BISULFATE 75 MG/1
75 TABLET, FILM COATED ORAL DAILY
Refills: 0 | Status: DISCONTINUED | OUTPATIENT
Start: 2025-07-30 | End: 2025-08-02

## 2025-07-30 RX ORDER — ASPIRIN 325 MG
81 TABLET ORAL DAILY
Refills: 0 | Status: DISCONTINUED | OUTPATIENT
Start: 2025-07-30 | End: 2025-08-02

## 2025-07-30 RX ORDER — ATORVASTATIN CALCIUM 80 MG/1
80 TABLET, FILM COATED ORAL AT BEDTIME
Refills: 0 | Status: DISCONTINUED | OUTPATIENT
Start: 2025-07-30 | End: 2025-08-02

## 2025-07-30 RX ORDER — CLOPIDOGREL BISULFATE 75 MG/1
75 TABLET, FILM COATED ORAL ONCE
Refills: 0 | Status: DISCONTINUED | OUTPATIENT
Start: 2025-07-30 | End: 2025-07-30

## 2025-07-30 RX ADMIN — CLOPIDOGREL BISULFATE 75 MILLIGRAM(S): 75 TABLET, FILM COATED ORAL at 09:28

## 2025-07-30 RX ADMIN — ATORVASTATIN CALCIUM 80 MILLIGRAM(S): 80 TABLET, FILM COATED ORAL at 21:25

## 2025-07-30 RX ADMIN — Medication 81 MILLIGRAM(S): at 09:22

## 2025-07-30 NOTE — ED CDU PROVIDER INITIAL DAY NOTE - DETAILS
vital signs q4h, neuro checks q4h, monitor on tele, MRi, neuro consult, frequent re-evaluation  case d/w ED team

## 2025-07-30 NOTE — ED PROVIDER NOTE - PROGRESS NOTE DETAILS
Charlette Merritt, PGY-4: d/w neurology resident.     Recommendations for SBP <220. CDU for MRI. Also recommending ASA 81mg, Plavix 75mg, Atorvastatin 80mg, ECHO. q4h neuro checks

## 2025-07-30 NOTE — ED CDU PROVIDER INITIAL DAY NOTE - OBJECTIVE STATEMENT
75y M with PMH of CAD, BPH, HTN, presenting with dysarthria. On initial assessment, patient slow to respond to simple questions including name and dysarthric. Reports that wife told him to come in because he wasn't making sense. Reports mild sob intermittently but denies any CP. Patient reports that his LKW was 6am reports he woke up feeling well and then wife noted he had a hard time speaking. Currently also reports trouble getting words out. . Code stroke called. On reassessment in the ED, patient reports that he feels like is speaking well. Is no longer having latency in speech. Able to recall in detail what happened this AM. Does report he was understanding his wife as she was talking to him but he had a difficult time responding. I called his wife as well, and she reports the last time she saw him well was before bed last night around 11pm. This AM, she asked him how their cat was, and he was not able to form a sentence. She reports that he also appeared to recognize that he could not speak well.    In the ED VSS.  labs unremarkable.  CT/CTA negative for acute pathology.  neuro consult recommending observation in CDU for tele, TTE, MRI

## 2025-07-30 NOTE — ED CDU PROVIDER INITIAL DAY NOTE - PROGRESS NOTE DETAILS
Spoke with neurology, states that patient cleared to follow up with outpatient neurology to continue aspirin/ Plavix, will write chart note. - LEILA RayaC MRI resulted, pending neurology call back.   Patient endorsed some shortness of breath, chest discomfort, and right hand numbness a few minutes ago, now improved. Patient denied these symptoms prior to coming to ER today. Cardiologist: Dr. Heber Higgins, had cardiac stent a few years ago.  Patiet NAD, well appearing, A+OX3, clear speech, strength 5/5 bilateral UE/LE. EKG performed NSR and troponin sent. Discussed with Dr. Dotson, will consult cardiology for the morning. - Malka Gonzalez PA-C Attending MD Dotson: Reported chest pain, was made aware, cards will see in AM

## 2025-07-30 NOTE — ED PROVIDER NOTE - CLINICAL SUMMARY MEDICAL DECISION MAKING FREE TEXT BOX
75y M with PMH of CAD, BPH, HTN, presenting with dysarthria. On initial assessment, patient slow to respond to simple questions including name and dysarthric. Reports that wife told him to come in because he wasn't making sense. Reports mild sob intermittently but denies any CP. Patient reports that his LKW was 6am - reports he woke up feeling well and then wife noted he had a hard time speaking. Currently also reports trouble getting words out. . Code stroke called.     On reassessment, patient reports that he feels like is speaking well. Is no longer having latency in speech. Able to recall in detail what happened this AM. Does report he was understanding his wife as she was talking to him but he had a difficult time responding. I called his wife as well, and she reports the last time she saw him well was before bed last night around 11pm. This AM, she asked him how their cat was, and he was not able to form a sentence. She reports that he also appeared to recognize that he could not speak well. She took his BP and SBP was 150s.    VS show htn otherwise WNL.     HEAD:  Atraumatic, Normocephalic  EYES: EOMI, conjunctiva and sclera clear  ENT: Moist mucous membranes  CHEST/LUNG: Unlabored respirations. Clear to auscultation bilaterally. No rales, rhonchi, wheezing, or rubs  HEART: Regular rate and rhythm. No murmurs, rubs, or gallops  ABDOMEN: Soft, nondistended, nontender  EXTREMITIES:  No cyanosis or edema.   NERVOUS SYSTEM:  Slurred speech, delay in responding. A&Ox2-3. No nystagmus. Strength intact in UE/LE. No pronator drift. Sensation intact in UE/LE/face. EOMI. Midline tongue. Symmetric smile   SKIN: No rashes or lesions    ddx include but not limited to TIA vs electrolyte disturbance, uti. Assess for ACS, CHF, PNA, viral syndrome

## 2025-07-30 NOTE — CONSULT NOTE ADULT - ASSESSMENT
Assessment:    75M w/ hx of CAD, HTN. HLD. BPH presenting for confusion and dysarthria. Word finding difficulties lasting >1 hour suggestive of aphasia. ABCD2 score=5. NIHSS improving from 1 to 0. Patient took his losartan at home (first time in a while) when he was not feeling well.    LKW: 7/29/25 @23:00  NIHSS: 1 (LOC month) improving to 0  ABCD2: 5 (age, BP, aphasia, duration-2)  Baseline mRS: 0  Not a Tenecteplase candidate due to OOW  Not a thrombectomy candidate due to no LVO    Impression:      Transient >1hour of aphasia + dysarthria and confusion suggestive fo left brain dysfunction in patient with uncontrolled vascular risk factors concerning for TIA +/- malignant hypertension given SBP in 180's upon arrival after taking losartan at home.    Plan/Recommendations:    - Recommend CDU admission for following workup  [] MRI brain without contrast    [] TTE without bubble   [] Send HbA1c, Lipid panel  [] Start DAPT with Aspirin 81mg and Plavix 75mg; stop Plavix after 3 weeks and continue asprin 81mg  [] Keep BP permissive up to 220/110 for 48 hours from symptom onset followed by gradual normotension to 120/80  [] Atorvastatin 80mg (titrate to goal LDL < 70 outpatient)    - Monitor on Telemetry   - Stroke Neurological checks and vital signs q4 hours while in hospital  - Inpatient blood glucose goal 140-180. Long-term goal HgbA1c < 6%  [] NPO pending dysphagia screening. SLP/S&S eval if fails   [] PT/OT eval  [] DVT PPx with SCD + Chemical PPx  - Please help patient find new PCP, can consider referal to [facility] [place] or [address] [place] [address] Internal medicine clinics  - Patient should follow up with Stroke NP, Evelyne Kelley or Jenna Osborn, in clinic at [address] [place] [address]. Please email [email or web address] w/ basic PHI.    Patient discussed with Stroke fellow Dr. Darrion Daniel under supervision of Stroke attending Dr. Gale English regarding decision against candidacy for Tenecteplase / thrombectomy.

## 2025-07-30 NOTE — ED ADULT NURSE NOTE - OBJECTIVE STATEMENT
75 yr old male ambulatory from triage, initially stated chest discomfort and SOB during triage interview, upon entry to [facility] Room 2, pt states "wife told me to come to hospital because I can't talk", *Code stroke initiated at 0749am for dysarthria, mildly garbled speech, A&Ox4, following all commands, no facial asymmetry noted, +hypertensive, pt brought to CT, met with neuro resident, dysarthria improved, maex4: strong, sensation intact 75 yr old male ambulatory from triage (drove self to ED, states wife not coming to ED, "wife is sick"), initially stated chest discomfort and SOB during triage interview, upon arrival to [facility] Room 2, pt states "wife told me to come to hospital because I can't talk", *Code stroke initiated at 0749am for dysarthria, mildly garbled speech, A&Ox4, following all commands, no facial asymmetry noted, +hypertensive (noncompliant with meds - per chart review of previous charts), pt brought to CT, met with neuro resident, dysarthria improved, maex4: strong, sensation intact

## 2025-07-30 NOTE — ED CDU PROVIDER INITIAL DAY NOTE - PHYSICAL EXAMINATION
Gen: AAO x 3, NAD  HEENT: NC/AT, PERRLA, EOMI, MMM  Resp: unlabored CTAB  Cardiac: rrr s1s2  GI: ND, +BS, Soft, NT  Ext: no pedal edema, FROM in all extremities  Neuro: Strength 5/5 BUE and BLE, sensation intact, clear speech, no facial asymmetry

## 2025-07-30 NOTE — ED PROVIDER NOTE - ATTENDING CONTRIBUTION TO CARE
75-year-old gentleman with history of diabetes not on insulin hypertension hyperlipidemia noncompliant with medications and recent diagnosis of infected kidney stones presenting with confusion and altered mental status since this morning initially triaged as chest pain but upon interviewing states that patient was sent in by wife because he was not making sense and was slow to respond denies any recent infectious symptoms denies any chest pain shortness of breath currently based on this a code stroke was called and in accordance with stroke protocol fingerstick was obtained which was normal and after discussion with the neurology stroke team given NIH stroke scale of 0 proceeded with CT angiography without perfusion scans  Exam is otherwise unremarkable  Differential includes TIA versus stroke versus episode of hypoglycemia versus infectious etiology will do labs and EKG  Neurology recommends MRI as an inpatient which we will proceed

## 2025-07-31 VITALS
HEART RATE: 72 BPM | SYSTOLIC BLOOD PRESSURE: 146 MMHG | DIASTOLIC BLOOD PRESSURE: 66 MMHG | OXYGEN SATURATION: 97 % | TEMPERATURE: 98 F | RESPIRATION RATE: 18 BRPM

## 2025-07-31 LAB
CULTURE RESULTS: SIGNIFICANT CHANGE UP
SPECIMEN SOURCE: SIGNIFICANT CHANGE UP

## 2025-07-31 PROCEDURE — 93005 ELECTROCARDIOGRAM TRACING: CPT

## 2025-07-31 PROCEDURE — 70551 MRI BRAIN STEM W/O DYE: CPT

## 2025-07-31 PROCEDURE — 93356 MYOCRD STRAIN IMG SPCKL TRCK: CPT

## 2025-07-31 PROCEDURE — 82962 GLUCOSE BLOOD TEST: CPT

## 2025-07-31 PROCEDURE — 70496 CT ANGIOGRAPHY HEAD: CPT

## 2025-07-31 PROCEDURE — 85730 THROMBOPLASTIN TIME PARTIAL: CPT

## 2025-07-31 PROCEDURE — 82435 ASSAY OF BLOOD CHLORIDE: CPT

## 2025-07-31 PROCEDURE — 70450 CT HEAD/BRAIN W/O DYE: CPT

## 2025-07-31 PROCEDURE — 36415 COLL VENOUS BLD VENIPUNCTURE: CPT

## 2025-07-31 PROCEDURE — 85025 COMPLETE CBC W/AUTO DIFF WBC: CPT

## 2025-07-31 PROCEDURE — 81001 URINALYSIS AUTO W/SCOPE: CPT

## 2025-07-31 PROCEDURE — 85610 PROTHROMBIN TIME: CPT

## 2025-07-31 PROCEDURE — 84484 ASSAY OF TROPONIN QUANT: CPT

## 2025-07-31 PROCEDURE — 80307 DRUG TEST PRSMV CHEM ANLYZR: CPT

## 2025-07-31 PROCEDURE — 85014 HEMATOCRIT: CPT

## 2025-07-31 PROCEDURE — 76376 3D RENDER W/INTRP POSTPROCES: CPT

## 2025-07-31 PROCEDURE — 93306 TTE W/DOPPLER COMPLETE: CPT

## 2025-07-31 PROCEDURE — 99291 CRITICAL CARE FIRST HOUR: CPT | Mod: 25

## 2025-07-31 PROCEDURE — 84132 ASSAY OF SERUM POTASSIUM: CPT

## 2025-07-31 PROCEDURE — 87086 URINE CULTURE/COLONY COUNT: CPT

## 2025-07-31 PROCEDURE — 83036 HEMOGLOBIN GLYCOSYLATED A1C: CPT

## 2025-07-31 PROCEDURE — 80053 COMPREHEN METABOLIC PANEL: CPT

## 2025-07-31 PROCEDURE — 36000 PLACE NEEDLE IN VEIN: CPT

## 2025-07-31 PROCEDURE — 70498 CT ANGIOGRAPHY NECK: CPT

## 2025-07-31 PROCEDURE — 82330 ASSAY OF CALCIUM: CPT

## 2025-07-31 PROCEDURE — G0378: CPT

## 2025-07-31 PROCEDURE — 71045 X-RAY EXAM CHEST 1 VIEW: CPT

## 2025-07-31 PROCEDURE — 82947 ASSAY GLUCOSE BLOOD QUANT: CPT

## 2025-07-31 PROCEDURE — 80061 LIPID PANEL: CPT

## 2025-07-31 PROCEDURE — 84295 ASSAY OF SERUM SODIUM: CPT

## 2025-07-31 PROCEDURE — 83605 ASSAY OF LACTIC ACID: CPT

## 2025-07-31 PROCEDURE — 85018 HEMOGLOBIN: CPT

## 2025-07-31 PROCEDURE — 82803 BLOOD GASES ANY COMBINATION: CPT

## 2025-07-31 PROCEDURE — 99238 HOSP IP/OBS DSCHRG MGMT 30/<: CPT

## 2025-07-31 PROCEDURE — 93308 TTE F-UP OR LMTD: CPT

## 2025-07-31 RX ORDER — ATORVASTATIN CALCIUM 80 MG/1
1 TABLET, FILM COATED ORAL
Qty: 21 | Refills: 0
Start: 2025-07-31 | End: 2025-08-20

## 2025-07-31 RX ORDER — CLOPIDOGREL BISULFATE 75 MG/1
1 TABLET, FILM COATED ORAL
Qty: 21 | Refills: 0
Start: 2025-07-31 | End: 2025-08-20

## 2025-07-31 RX ADMIN — CLOPIDOGREL BISULFATE 75 MILLIGRAM(S): 75 TABLET, FILM COATED ORAL at 07:43

## 2025-07-31 RX ADMIN — Medication 81 MILLIGRAM(S): at 07:43

## 2025-07-31 NOTE — ED CDU PROVIDER DISPOSITION NOTE - CLINICAL COURSE
75y M with PMH of CAD, BPH, HTN, presenting with dysarthria. On initial assessment, patient slow to respond to simple questions including name and dysarthric. Reports that wife told him to come in because he wasn't making sense. Reports mild sob intermittently but denies any CP. Patient reports that his LKW was 6am reports he woke up feeling well and then wife noted he had a hard time speaking. Currently also reports trouble getting words out. . Code stroke called. On reassessment in the ED, patient reports that he feels like is speaking well. Is no longer having latency in speech. Able to recall in detail what happened this AM. Does report he was understanding his wife as she was talking to him but he had a difficult time responding. I called his wife as well, and she reports the last time she saw him well was before bed last night around 11pm. This AM, she asked him how their cat was, and he was not able to form a sentence. She reports that he also appeared to recognize that he could not speak well.    	In the ED VSS.  labs unremarkable.  CT/CTA negative for acute pathology.  neuro consult recommending observation in CDU for tele, TTE, MRI 75y M with PMH of CAD, BPH, HTN, presenting with dysarthria. On initial assessment, patient slow to respond to simple questions including name and dysarthric. Reports that wife told him to come in because he wasn't making sense. Reports mild sob intermittently but denies any CP. Patient reports that his LKW was 6am reports he woke up feeling well and then wife noted he had a hard time speaking. Currently also reports trouble getting words out. . Code stroke called. On reassessment in the ED, patient reports that he feels like is speaking well. Is no longer having latency in speech. Able to recall in detail what happened this AM. Does report he was understanding his wife as she was talking to him but he had a difficult time responding. I called his wife as well, and she reports the last time she saw him well was before bed last night around 11pm. This AM, she asked him how their cat was, and he was not able to form a sentence. She reports that he also appeared to recognize that he could not speak well.    	In the ED VSS.  labs unremarkable.  CT/CTA negative for acute pathology.  neuro consult recommending observation in CDU for tele, TTE, MRI  In CDU, patient had TTE, MRI done. results reviewed and discussed with patient. advised on importance of following up with pcp and neuro and cardiology. discussed abnormal cholesterol and a1c. pt stable for discharge. discussed with Dr. Gipson

## 2025-07-31 NOTE — ED CDU PROVIDER DISPOSITION NOTE - NSFOLLOWUPINSTRUCTIONS_ED_ALL_ED_FT
Hydrate.     Continue taking Aspirin 81 mg once a day.  Continue taking Plavix 75 mg for 21 days.   Continue taking Atorvastatin 80 mg once a day.     Follow up with stroke neurology at [address] [place] [address] ([phone]) 1-2 weeks after discharge. Call  to schedule this appointment.     **CARDS    Please return to the Emergency Department with new, worsening, or concerning symptoms, such as:  -Shortness of breath or trouble breathing  -Pressure, pain, tightness in chest  -Facial drooping, arm weakness, or speech difficulty   -Head injury or loss of consciousness   -Nonstop bleeding or an open wound     *More detailed information regarding your visit and discharge can be found by reviewing this packet Hydrate.     Continue taking Aspirin 81 mg once a day.  Continue taking Plavix 75 mg for 21 days.   Continue taking Atorvastatin 80 mg once a day.      aspirin 81mg over the counter   plavix and atorvastatin at the pharmacy     Follow up with stroke neurology at [address] [place] [address] ([phone]) 1-2 weeks after discharge. Call  to schedule this appointment.     follow up with cardiology in 1-3 days     Please return to the Emergency Department with new, worsening, or concerning symptoms, such as:  -Shortness of breath or trouble breathing  -Pressure, pain, tightness in chest  -Facial drooping, arm weakness, or speech difficulty   -Head injury or loss of consciousness   -Nonstop bleeding or an open wound     *More detailed information regarding your visit and discharge can be found by reviewing this packet

## 2025-07-31 NOTE — ED CDU PROVIDER SUBSEQUENT DAY NOTE - HISTORY
No interval changes since initial CDU provider note. Pt feels well without new complaint. NAD VSS. no events on tele. Pending cardiology evaluation.  - MARIANNA Gonzalez

## 2025-07-31 NOTE — ED ADULT NURSE REASSESSMENT NOTE - NS ED NURSE REASSESS COMMENT FT1
MRI forms faxed
Pt received from ANJELICA Rutherford. Pt oriented to CDU & plan of care was discussed. Pt A&O x 4. Pt in CDU for vital signs q4h, neuro checks q4h, monitor on tele, MRi, neuro consult, frequent re-evaluation. Pt denies any lightheadedness, weakness, numbness, visual or speech disturbances, chest pain, SOB, dizziness or palpitations. Pt on a cardiac monitor in NSR, HR in 70's. V/S stable, pt afebrile,  IV in place, patent and free of signs of infiltration. Pt resting in bed. Safety & comfort measures maintained. Call bell in reach. Will continue to monitor.
awaiting transfer to CDU
patient currently at testing and will be brought to CDU room 66 once testing is complete.
pt back from ECHO, spouse at bedside, pt is eating denies symptoms, speech is appropriate. Pt placed in position of comfort. Pt educated on call bell system and provided call bell. Bed in lowest position, wheels locked, appropriate side rails raised. Pt denies needs at this time.
pt returns from CT, code stroke
pt to echo, okay off-tele for transport per dr carreno, neuro exam unchanged, vitals unchanged
Pt received from ANJELICA Ramey at 19:00hrs. Pt A&O x 4. Pt is observed in CDU for dysarthria, resolved. Pt c/o chest pain and mild SOB, O2 sat 94-96% RA. Denies any dizziness or palpitations. MARIANNA Gonzalez notified and evaluated patient. EKG done, trop sent. V/S stable, IV 18G Rt AC, patent and free of signs of infiltration. Pt resting in bed. Safety & comfort measures maintained. Call bell in reach. Will continue to monitor.

## 2025-07-31 NOTE — ED CDU PROVIDER DISPOSITION NOTE - CARE PROVIDER_API CALL
Jose Ramon Ho ()  Cardiovascular Disease  [address] [place] [address], [address] [address]  Phone: [phone]  Fax: [phone]  Follow Up Time: 1-3 Days

## 2025-07-31 NOTE — CHART NOTE - NSCHARTNOTEFT_GEN_A_CORE
Results of stroke workup reviewed, as below:    A1c 6.2,     < from: TTE W or WO Ultrasound Enhancing Agent (07.30.25 @ 12:31) >    CONCLUSIONS:      1. Left ventricular wall thickness is mildly increased. Left ventricular systolic function is grossly normal with an ejection fraction visually estimated at 55 to 60 %. There is poor visualization of the endocardial borders to determine the presence of wall motion abnormalities.   2.Normal right ventricular systolic function.    < end of copied text >    < from: MR Head No Cont (07.30.25 @ 19:34) >      IMPRESSION:    No acute intracranial process or regional mass effect.    Moderate chronic small vessel ischemic white matter change.    < end of copied text >    Impression: Transient >1hour of aphasia + dysarthria and confusion suggestive fo left brain dysfunction in patient with uncontrolled vascular risk factors concerning for TIA.    Recommendations:  [] No further neurologic workup inpatient, patient is neurologically cleared for discharge  [] Continue aspirin 81 mg+ plavix 75 mg x21 days  [] Continue atorvastatin 80 mg  [] BP gradual normotension to 120/80  [] Patient should follow up with stroke neurology at [address] [place] [address] ([phone]) 1-2 weeks after discharge. Please instruct the patient to call  to schedule this appointment.     Discussed with stroke fellow Dr. Daniel under supervision of attending Dr. English.

## 2025-07-31 NOTE — ED CDU PROVIDER SUBSEQUENT DAY NOTE - ATTENDING APP SHARED VISIT CONTRIBUTION OF CARE
This was a shared visit with LUIS MIGUEL.  I have reviewed and discussed the case with the LUIS MIGUEL and agree with verified documentation unless otherwise documented.  I have independently spoken with and examined the patient and my documentation of history/pe and MDM are above.

## 2025-07-31 NOTE — ED CDU PROVIDER DISPOSITION NOTE - PATIENT PORTAL LINK FT
You can access the FollowMyHealth Patient Portal offered by [facility] by registering at the following website: [email or web address]. By joining [employer]’s FollowMyHealth portal, you will also be able to view your health information using other applications (apps) compatible with our system.

## 2025-07-31 NOTE — CONSULT NOTE ADULT - SUBJECTIVE AND OBJECTIVE BOX
**STROKE CODE CONSULT NOTE**    Last known well time/Time of onset of symptoms: 7/29/25 @23:00    HPI:  75M w/ hx of CAD, HTN. HLD. BPH presenting for confusion and dysarthria. Patient went to bed in his usual state at 11:00 the night before.  When he woke up this morning wife found him confused, dysarthric, unable to find the right words to say when asked questions.  Patient usually does not take his losartan but was encouraged to do so by his wife this morning.  His blood pressure usually runs high at least 140s systolic.  Patient has not followed up with the physician for a while.  He drove himself to the ED today. No history of stroke or seizure however the patient has had cardiac stents placed before.  He supposed to be on aspirin but is not taking it. No AC.      PAST MEDICAL & SURGICAL HISTORY:  Hypertension  Hyperlipidemia  BPH (benign prostatic hyperplasia)  CAD (coronary artery disease)      FAMILY HISTORY:  No pertient hx noted    SOCIAL HISTORY:  never smoker, no recreational drug use, social EtOH use    ROS:  As Per HPI    MEDICATIONS:  Losartan (not taking)  Asprin (not taking)    Allergies  No Known Allergies  or Intolerances      Vital Signs Last 24 Hrs  T(C): 36.4 (30 Jul 2025 07:28), Max: 36.4 (30 Jul 2025 07:28)  T(F): 97.5 (30 Jul 2025 07:28), Max: 97.5 (30 Jul 2025 07:28)  HR: 81 (30 Jul 2025 07:28) (81 - 81)  BP: 197/78 (30 Jul 2025 07:28) (197/78 - 197/78)  BP(mean): --  RR: 18 (30 Jul 2025 07:28) (18 - 18)  SpO2: 95% (30 Jul 2025 07:28) (95% - 95%)    Parameters below as of 30 Jul 2025 07:28  Patient On (Oxygen Delivery Method): room air        PHYSICAL EXAM:  Constitutional: WDWN; NAD  Cardiovascular: no edema  Neurologic:  Mental status: Awake, alert and oriented x2, has difficulty with stating the month initially but improved, Recent memory intact.  Naming, repetition and comprehension intact.  Attention/concentration intact. Speech fluent   Cranial nerves: pupils equally round and reactive to light, visual fields full, no nystagmus, extraocular muscles intact, V1 through V3 intact bilaterally and symmetric, face symmetric, hearing intact to voice, tongue was midline, shoulder shrug strength bilaterally 5/5.    Motor:  Normal bulk and tone, strength 5/5 in bilateral upper and lower extremities.   strength 5/5.  Rapid alternating movements intact and symmetric.   Sensation: Intact to light touch throught and symmetric  Coordination: No dysmetria on finger-to-nose and heel-to-shin.  No clumsiness.  Reflexes: 2+ in upper and lower extremities  Gait: Narrow and steady. No ataxia.  Romberg negative    NIHSS: 1 improving to 0    Fingerstick Blood Glucose: CAPILLARY BLOOD GLUCOSE  POCT Blood Glucose.: 109 mg/dL (30 Jul 2025 07:44)       LABS:                        15.3   6.47  )-----------( 199      ( 30 Jul 2025 08:08 )             47.1     07-30    140  |  102  |  22  ----------------------------<  124[H]  4.1   |  21[L]  |  1.28    Ca    9.4      30 Jul 2025 08:08  TPro  7.3  /  Alb  4.5  /  TBili  0.9  /  DBili  x   /  AST  22  /  ALT  17  /  AlkPhos  108  07-3      Urinalysis Basic - ( 30 Jul 2025 08:08 )    Color: x / Appearance: x / SG: x / pH: x  Gluc: 124 mg/dL / Ketone: x  / Bili: x / Urobili: x   Blood: x / Protein: x / Nitrite: x   Leuk Esterase: x / RBC: x / WBC x   Sq Epi: x / Non Sq Epi: x / Bacteria: x    PT/INR - ( 30 Jul 2025 08:08 )   PT: 11.5 sec;   INR: 1.01 ratio    PTT - ( 30 Jul 2025 08:08 )  PTT:29.8 sec      POCT Blood Glucose.: 109 mg/dL (30 Jul 2025 07:44)      RADIOLOGY & ADDITIONAL STUDIES:  CTH & CTA:  Age-appropriate involutional and ischemic gliotic changes. No hemorrhage. Normal CTA of the head and neck.      IV-tenecteplase(Y/N):    N                               Bolus time:  Reason IV-tenecteplase not given:  
DATE OF SERVICE: 07-31-25      CHIEF COMPLAINT:Patient is a 75y old  Male who presents with a chief complaint of dysarthria    HISTORY OF PRESENT ILLNESS:HPI: 75y M with PMH of CAD, BPH, HTN, presenting with dysarthria. On initial assessment, patient slow to respond to simple questions including name and dysarthric. Reports that wife told him to come in because he wasn't making sense. Reports mild sob intermittently but denies any CP. Patient reports that his LKW was 6am reports he woke up feeling well and then wife noted he had a hard time speaking. Currently also reports trouble getting words out. . Code stroke called. On reassessment in the ED, patient reports that he feels like is speaking well. Is no longer having latency in speech. Able to recall in detail what happened this AM. Does report he was understanding his wife as she was talking to him but he had a difficult time responding. I called his wife as well, and she reports the last time she saw him well was before bed last night around 11pm. This AM, she asked him how their cat was, and he was not able to form a sentence. She reports that he also appeared to recognize that he could not speak well.    In the ED VSS.  labs unremarkable.  CT/CTA negative for acute pathology.  neuro consult recommending observation in CDU for tele, TTE, MRI      PAST MEDICAL & SURGICAL HISTORY:  Hypertension      Hyperlipidemia      BPH (benign prostatic hyperplasia)      CAD (coronary artery disease)          MEDICATIONS:  aspirin enteric coated 81 milliGRAM(s) Oral daily  clopidogrel Tablet 75 milliGRAM(s) Oral daily    atorvastatin 80 milliGRAM(s) Oral at bedtime        FAMILY HISTORY:      Non-contributory    SOCIAL HISTORY:    [ ] Not a smoker    Allergies    No Known Allergies    Intolerances    	    REVIEW OF SYSTEMS:  CONSTITUTIONAL: No fever  EYES: No eye pain, visual disturbances, or discharge  ENMT:  No difficulty hearing, tinnitus  NECK: No pain or stiffness  RESPIRATORY: No cough, wheezing,  CARDIOVASCULAR: No chest pain, palpitations, passing out, dizziness, or leg swelling  GASTROINTESTINAL:  No nausea, vomiting, diarrhea or constipation. No melena.  GENITOURINARY: No dysuria, hematuria  NEUROLOGICAL: No stroke like symptoms  SKIN: No burning or lesions   ENDOCRINE: No heat or cold intolerance  MUSCULOSKELETAL: No joint pain or swelling  PSYCHIATRIC: No  anxiety, mood swings  HEME/LYMPH: No bleeding gums  ALLERGY AND IMMUNOLOGIC: No hives or eczema	    All other ROS negative    PHYSICAL EXAM:  T(C): 36.4 (07-31-25 @ 09:03), Max: 36.7 (07-31-25 @ 00:24)  HR: 72 (07-31-25 @ 09:03) (72 - 78)  BP: 146/66 (07-31-25 @ 09:03) (125/70 - 155/80)  RR: 18 (07-31-25 @ 09:03) (18 - 18)  SpO2: 97% (07-31-25 @ 09:03) (95% - 98%)  Wt(kg): --  I&O's Summary      Appearance: Normal	  HEENT:   Normal oral mucosa, EOMI	  Cardiovascular:  S1 S2, No JVD,    Respiratory: Lungs clear to auscultation	  Psychiatry: Alert  Gastrointestinal:  Soft, Non-tender, + BS	  Skin: No rashes   Neurologic: Non-focal  Extremities:  No edema  Vascular: Peripheral pulses palpable    	    	  	  CARDIAC MARKERS:  Labs personally reviewed by me                                  15.3   6.47  )-----------( 199      ( 30 Jul 2025 08:08 )             47.1     07-30    140  |  102  |  22  ----------------------------<  124[H]  4.1   |  21[L]  |  1.28    Ca    9.4      30 Jul 2025 08:08    TPro  7.3  /  Alb  4.5  /  TBili  0.9  /  DBili  x   /  AST  22  /  ALT  17  /  AlkPhos  108  07-30          EKG: Personally reviewed by me - NSR  Radiology: Personally reviewed by me - CXR clear lungs      Assessment /Plan:   75y M with PMH of CAD, BPH, HTN, presenting with dysarthria.     1. Dysarthria - neuro follow up appreciated, ?TIA  - Discussed with pt and son at bedside rec prolonged cardiac monitor to r/o occult AF   - TTE unremarkable    2. CAD s/p stents - continue with ASA and statin    3. HTN -initially uncontrolled but now better controlled        Differential diagnosis and plan of care discussed with patient after the evaluation. Counseling on diet, nutritional counseling, weight management, exercise and medication compliance was done.   Advanced care planning/advanced directives discussed with patient/family. DNR status including forceful chest compressions to attempt to restart the heart, ventilator support/artificial breathing, electric shock, artificial nutrition, health care proxy, Molst form all discussed with pt. Pt wishes to consider. Sixteen minutes spent on discussing advanced directives.             Jose Ramon Ho DO [facility]  Cardiovascular Medicine  [address] [place] [address], [address]  Office [phone]  Available via call/text on [employer] Teams

## 2025-07-31 NOTE — ED CDU PROVIDER DISPOSITION NOTE - ATTENDING CONTRIBUTION TO CARE
I , Dr Valentino Gipson has seen and evaluated the patient.  The patient reports improvement in symptoms.  The patient is stable for discharge home and will follow up with their primary physician, cardiology and neurology .

## 2025-07-31 NOTE — ED CDU PROVIDER SUBSEQUENT DAY NOTE - PROGRESS NOTE DETAILS
shelli pt seen and eval , neuro intact feeling at baseline bno cp -- ? cp prev trop neg echo unremarkable - mr neg awaiting final recs from cards and neuro pt seen and evaluated at bedside this morning. neuro intact. offers no complaints at this time. pending cards eval at this time. pt seen by cardiology. states pt can follow up outpatient. neuro recommendations reviewed. advised on importance of picking up medication from the pharmacy. advised to follow up with cardiology and neurology this week. pt in agreement with plan. all questions answered. stable for discharge. discussed with Dr. Gipson

## 2025-08-01 LAB — DRUG SCREEN, SERUM: SIGNIFICANT CHANGE UP

## (undated) DEVICE — SOL IRR BAG H2O 3000ML

## (undated) DEVICE — POSITIONER FOAM EGG CRATE ULNAR 2PCS (PINK)

## (undated) DEVICE — ACMI SELF-SEALING SEAL UP TO 7FR

## (undated) DEVICE — IRR BULB PATHFINDER + 10"

## (undated) DEVICE — GOWN TRIMAX LG

## (undated) DEVICE — TUBING THERMADX UROLOGY

## (undated) DEVICE — PACK CYSTO

## (undated) DEVICE — SYR ASEPTO

## (undated) DEVICE — PRESSURE INFUSOR BAG 3000ML

## (undated) DEVICE — DRAPE LINGEMAN TUR

## (undated) DEVICE — WARMING BLANKET UPPER ADULT

## (undated) DEVICE — SOL IRR POUR H2O 1500ML

## (undated) DEVICE — PREP BETADINE KIT

## (undated) DEVICE — TUBING SUCTION 20FT

## (undated) DEVICE — GLV 8 PROTEXIS (WHITE)

## (undated) DEVICE — VENODYNE/SCD SLEEVE CALF MEDIUM

## (undated) DEVICE — POSITIONER FOAM HEADREST (PINK)

## (undated) DEVICE — DRAPE EQUIPMENT BANDED BAG 30 X 30" (SHOWER CAP)

## (undated) DEVICE — FOLEY HOLDER STATLOCK 2 WAY ADULT